# Patient Record
Sex: MALE | Race: WHITE | Employment: OTHER | URBAN - METROPOLITAN AREA
[De-identification: names, ages, dates, MRNs, and addresses within clinical notes are randomized per-mention and may not be internally consistent; named-entity substitution may affect disease eponyms.]

---

## 2017-11-22 PROBLEM — M25.551 RIGHT HIP PAIN: Status: ACTIVE | Noted: 2017-11-22

## 2018-06-05 ENCOUNTER — HOSPITAL ENCOUNTER (OUTPATIENT)
Dept: GENERAL RADIOLOGY | Age: 68
Discharge: HOME | End: 2018-06-05
Attending: INTERNAL MEDICINE
Payer: MEDICARE

## 2018-06-05 ENCOUNTER — HOSPITAL ENCOUNTER (OUTPATIENT)
Dept: ULTRASOUND IMAGING | Age: 68
Discharge: HOME | End: 2018-06-05
Attending: INTERNAL MEDICINE
Payer: MEDICARE

## 2018-06-05 DIAGNOSIS — M25.571 RIGHT ANKLE PAIN: ICD-10-CM

## 2018-06-05 DIAGNOSIS — M79.89 RIGHT LEG SWELLING: ICD-10-CM

## 2018-06-05 PROCEDURE — 73610 X-RAY EXAM OF ANKLE: CPT

## 2018-06-05 PROCEDURE — 93971 EXTREMITY STUDY: CPT

## 2018-06-05 NOTE — PROGRESS NOTES
R ankle xray negative for fracture or dislocation; he does have a small heel spur; he is to continue ice pack, rest, elevating leg, and is to call if no improvement in symptoms;

## 2018-06-13 ENCOUNTER — HOSPITAL ENCOUNTER (OUTPATIENT)
Dept: SLEEP MEDICINE | Age: 68
Discharge: HOME | End: 2018-06-13
Payer: MEDICARE

## 2018-06-13 PROCEDURE — 95810 POLYSOM 6/> YRS 4/> PARAM: CPT

## 2020-07-07 ENCOUNTER — HOSPITAL ENCOUNTER (OUTPATIENT)
Dept: PHYSICAL THERAPY | Age: 70
Discharge: HOME | End: 2020-07-07
Attending: INTERNAL MEDICINE
Payer: MEDICARE

## 2020-07-07 DIAGNOSIS — M54.2 CERVICALGIA: ICD-10-CM

## 2020-07-07 PROCEDURE — 97162 PT EVAL MOD COMPLEX 30 MIN: CPT

## 2020-07-07 PROCEDURE — 97110 THERAPEUTIC EXERCISES: CPT

## 2020-07-07 NOTE — PROGRESS NOTES
Gregg Rios  : 1950  [place]  Secondary: Jordon Morales at [address], [address], [place] [address].  [phone]   [phone]         OUTPATIENT PHYSICAL THERAPY: Daily Treatment Note 2020  Visit Count:  1    ICD-10: Treatment Diagnosis: Cervicalgia (M54.2); Abnormal posture (R29.3)  Precautions/Allergies:   Patient has no known allergies. TREATMENT PLAN:  Effective Dates: 2020 TO 10/3/2020 (90 days). Frequency/Duration: 1-2 times a week as needed for 60- 90 Day(s)    Pre-treatment Symptoms/Complaints: Forward head posture, tightness. Pain: Initial: Pain Intensity 1: 0 0/10 Post Session:  0/10   Medications Last Reviewed:  2020  Updated Objective Findings:  See evaluation note from today  TREATMENT:     THERAPEUTIC EXERCISE: (20 minutes):  Exercises per grid below to improve mobility and strength. Required minimal verbal cues to promote proper body alignment, promote proper body posture and promote proper body mechanics. Progressed resistance, range and repetitions as indicated. Date:  20 Date:   Date:     Activity/Exercise Parameters Parameters Parameters   Scapular retraction Green band x 10 reps B     B shoulder ER/Lower trap Red band x 10 reps B     Door way pec stretch 90 degrees and Y position 3 x 30 sec B     Upper trap stretch 30 sec x 3 B     Levator scap stretch 30 sec x 3 B     Standing against wall chin tuck 5 sec x 10 reps     Hamstring stretching Go over next time         MANUAL THERAPY: (0 minutes): Soft tissue mobilization was utilized and necessary because of the patient's restricted motion of soft tissue. Med[employer] Portal  Treatment/Session Summary:    · Response to Treatment:  Patient tolerated session well. Patient needed cuing for posture/technique. Will reassess knowledge of exercises next session. Continue to progress as tolerated. · Communication/Consultation:  None today  · Equipment provided today:  None today  · Recommendations/Intent for next treatment session: Next visit will focus on exercises, stretching, postural education.     Total Treatment Billable Duration:  20 minutes + eval mod  PT Patient Time In/Time Out  Time In: 0840  Time Out: [address], PT    Future Appointments   Date Time Provider Santos Espinoza   7/9/2020 10:00 AM TERESITA Junior [facility]   7/17/2020  8:45 AM Rosibel Carroll, PT [facility]   7/27/2020 10:15 AM Rosibel Carroll, PT SFE[facility]PT SFE   12/24/2020  8:00 AM MAT LAB [facility] MAT [facility]   12/29/2020  9:40 AM Bonnie Bernal MD [facility]
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
## 2020-07-17 ENCOUNTER — HOSPITAL ENCOUNTER (OUTPATIENT)
Dept: PHYSICAL THERAPY | Age: 70
Discharge: HOME | End: 2020-07-17
Attending: INTERNAL MEDICINE
Payer: MEDICARE

## 2020-07-17 PROCEDURE — 97110 THERAPEUTIC EXERCISES: CPT

## 2020-07-17 NOTE — PROGRESS NOTES
Susie Rios  : 1950  [place]  Secondary: Jordon Morales at [place]  [address], [address], [place] [address].  [phone]   [phone]         OUTPATIENT PHYSICAL THERAPY: Daily Treatment Note 2020  Visit Count:  2    ICD-10: Treatment Diagnosis: Cervicalgia (M54.2); Abnormal posture (R29.3)  Precautions/Allergies:   Patient has no known allergies. TREATMENT PLAN:  Effective Dates: 2020 TO 10/3/2020 (90 days). Frequency/Duration: 1-2 times a week as needed for 60- 90 Day(s)    Pre-treatment Symptoms/Complaints: Forward head posture, tightness. Working on [employer], but need to go over them to make sure I\"m doing them right. Neck is stiff in the mornings but improves as I move around. No pain. Pain: Initial:   0/10 Post Session:  0/10   Medications Last Reviewed:  2020  Updated Objective Findings:  None Today  TREATMENT:     THERAPEUTIC EXERCISE: (50 minutes):  Exercises per grid below to improve mobility and strength. Required minimal verbal cues to promote proper body alignment, promote proper body posture and promote proper body mechanics. Progressed resistance, range and repetitions as indicated.    Date:  20 Date:  20 Date:     Activity/Exercise Parameters Parameters Parameters   UBE  Level 2 x 5 minutes fwd/back warm up    Scapular retraction Green band x 10 reps B Blue tubing 2 x 15 reps B    Pull downs  Blue tubing 2 x 10 reps B    B shoulder ER/Lower trap Red band x 10 reps B Red band 2 x 10 reps B    B shoulder extension  Green tubing 2 x 10 reps B    Door way pec stretch 90 degrees and Y position 3 x 30 sec B 90 degrees and Y position 3 x 30 sec B    Upper trap stretch 30 sec x 3 B 30 sec x 2 B    Levator scap stretch 30 sec x 3 B 30 sec x 2 B    Standing against wall chin tuck 5 sec x 10 reps 5 sec x 10 reps    Hamstring stretching Go over next time Supine active hamstring stretch 2 x 30 sec B with towel    Prone exercises: horizontal abduction; shoulder extension; superman  X 10 reps B each exercise with 5 sec  holds    Standing with back against wall: chin tucks and scap retraction  3 sec holds 2 x 10 reps    Bridging  X 15 reps, 5 sec hold                    MedBridge Portal  Treatment/Session Summary:    · Response to Treatment:  Patient tolerated session well. He needs cuing for posture during exercises but was able to cue himself some today. Added exercises to HEP. Continue to progress as tolerated. · Communication/Consultation:  None today  · Equipment provided today:  None today  · Recommendations/Intent for next treatment session: Next visit will focus on exercises, stretching, postural education.     Total Treatment Billable Duration:  50 minutes   PT Patient Time In/Time Out  Time In: 7882  Time Out: [address], PT    Future Appointments   Date Time Provider Santos Espinoza   7/20/2020  8:30 AM TERESITA Ramesh [facility]   7/27/2020 10:15 AM Cordell Gutiérrez PT SFE[facility]PT SFE   12/24/2020  8:00 AM MAT LAB [facility] MAT [facility]   12/29/2020  9:40 AM Neto Fragoso MD [facility]

## 2020-07-27 ENCOUNTER — HOSPITAL ENCOUNTER (OUTPATIENT)
Dept: PHYSICAL THERAPY | Age: 70
Discharge: HOME | End: 2020-07-27
Attending: INTERNAL MEDICINE
Payer: MEDICARE

## 2020-07-27 PROCEDURE — 97110 THERAPEUTIC EXERCISES: CPT

## 2020-07-27 NOTE — PROGRESS NOTES
Dona Rios  : 1950  [place]  Secondary: Jordon Romo 73 at [address], [address], [facility] .  [phone]   [phone]         OUTPATIENT PHYSICAL THERAPY: Daily Treatment Note 2020  Visit Count:  3    ICD-10: Treatment Diagnosis: Cervicalgia (M54.2); Abnormal posture (R29.3)  Precautions/Allergies:   Patient has no known allergies. TREATMENT PLAN:  Effective Dates: 2020 TO 10/3/2020 (90 days). Frequency/Duration: 1-2 times a week as needed for 60- 90 Day(s)    Pre-treatment Symptoms/Complaints: Forward head posture, tightness. Stiffness and muscle soreness in mornings. Get sore in lower shoulder blade region. Pain: Initial:   0/10 Post Session:  0/10   Medications Last Reviewed:  2020  Updated Objective Findings:  None Today  TREATMENT:     THERAPEUTIC EXERCISE: (45 minutes):  Exercises per grid below to improve mobility and strength. Required minimal verbal cues to promote proper body alignment, promote proper body posture and promote proper body mechanics. Progressed resistance, range and repetitions as indicated.    Date:  20 Date:  20 Date:  20   Activity/Exercise Parameters Parameters Parameters   UBE  Level 2 x 5 minutes fwd/back warm up Level 1.5 x 6 minutes fwd/back warm up   Scapular retraction Green band x 10 reps B Blue tubing 2 x 15 reps B Blue tubing 2 x 15 reps B   Pull downs  Blue tubing 2 x 10 reps B Blue tubing 2 x 10 reps B   B shoulder ER/Lower trap Red band x 10 reps B Red band 2 x 10 reps B Red band 2 x 10 reps B   B shoulder extension  Green tubing 2 x 10 reps B Green tubing 2 x 10 reps B   Door way pec stretch 90 degrees and Y position 3 x 30 sec B 90 degrees and Y position 3 x 30 sec B 90 degrees and Y position 3 x 30 sec B   Wall pushups   2x10 reps    Upper trap stretch 30 sec x 3 B 30 sec x 2 B 30 sec x 2 B   Levator scap stretch 30 sec x 3 B 30 sec x 2 B 30 sec x 2 B   Hamstring stretching Go over next time Supine active hamstring stretch 2 x 30 sec B with towel Supine active hamstring stretch 2 x 30 sec B with towel   Prone exercises: horizontal abduction; shoulder extension; superman  X 10 reps B each exercise with 5 sec  holds X 10 reps B each exercise with 5 sec  holds   Standing with back against wall: chin tucks and scap retraction  3 sec holds 2 x 10 reps    Supine chin tucks   3 sec holds    x 10 reps   Bridging  X 15 reps, 5 sec hold X 15 reps, 5 sec hold   Isometric hip flexion in supine hooklying   5 sec x 5 each leg             MedBridge Portal  Treatment/Session Summary:    · Response to Treatment:  Patient tolerated session well. He needed some cuing for technique during exercises but is demonstrating improving competence. Continue to progress as tolerated. · Communication/Consultation:  None today  · Equipment provided today:  None today  · Recommendations/Intent for next treatment session: Next visit will focus on exercises, stretching, postural education.     Total Treatment Billable Duration:  45 minutes   PT Patient Time In/Time Out  Time In: 1462  Time Out: [address] [facility],     Future Appointments   Date Time Provider Santos Olga   12/24/2020  8:00 AM MAT LAB [facility]   12/29/2020  9:40 AM Troy Mirza MD [facility]

## 2020-08-04 ENCOUNTER — HOSPITAL ENCOUNTER (OUTPATIENT)
Dept: PHYSICAL THERAPY | Age: 70
Discharge: HOME | End: 2020-08-04
Attending: INTERNAL MEDICINE
Payer: MEDICARE

## 2020-08-04 PROCEDURE — 97110 THERAPEUTIC EXERCISES: CPT

## 2020-08-04 NOTE — PROGRESS NOTES
Millicent Rios  : 1950  [place]  Secondary: Jordon Morales at [address], [address], [place] [address].  [phone]   [phone]         OUTPATIENT PHYSICAL THERAPY: Daily Treatment Note 2020  Visit Count:  4    ICD-10: Treatment Diagnosis: Cervicalgia (M54.2); Abnormal posture (R29.3)  Precautions/Allergies:   Patient has no known allergies. TREATMENT PLAN:  Effective Dates: 2020 TO 10/3/2020 (90 days). Frequency/Duration: 1-2 times a week as needed for 60- 90 Day(s)    Pre-treatment Symptoms/Complaints: Forward head posture, tightness. Doing exercises, get some soreness but no pain. Pain: Initial: Pain Intensity 1: 0 0/10 Post Session:  0/10   Medications Last Reviewed:  2020  Updated Objective Findings:  None Today  TREATMENT:     THERAPEUTIC EXERCISE: (40 minutes):  Exercises per grid below to improve mobility and strength. Required minimal verbal cues to promote proper body alignment, promote proper body posture and promote proper body mechanics. Progressed resistance, range and repetitions as indicated.    Date:  20 Date:  20 Date:  20 Date   20   Activity/Exercise Parameters Parameters Parameters    UBE  Level 2 x 5 minutes fwd/back warm up Level 1.5 x 6 minutes fwd/back warm up Level 2 x 6 minutes fwd/back warm up   Scapular retraction Green band x 10 reps B Blue tubing 2 x 15 reps B Blue tubing 2 x 15 reps B Blue tubing 2 x 15 reps B   Pull downs  Blue tubing 2 x 10 reps B Blue tubing 2 x 10 reps B Blue tubing 2 x 10 reps B   B shoulder ER/Lower trap Red band x 10 reps B Red band 2 x 10 reps B Red band 2 x 10 reps B Red band 2 x 10 reps B   B shoulder extension  Green tubing 2 x 10 reps B Green tubing 2 x 10 reps B Green tubing 2 x 10 reps B   Door way pec stretch 90 degrees and Y position 3 x 30 sec B 90 degrees and Y position 3 x 30 sec B 90 degrees and Y position 3 x 30 sec B 90 degrees and Y position 3 x 30 sec B   Wall pushups   2x10 reps  2x15 reps    Upper trap stretch 30 sec x 3 B 30 sec x 2 B 30 sec x 2 B 30 sec x 2 B   Levator scap stretch 30 sec x 3 B 30 sec x 2 B 30 sec x 2 B 30 sec x 2 B   Hamstring stretching Go over next time Supine active hamstring stretch 2 x 30 sec B with towel Supine active hamstring stretch 2 x 30 sec B with towel Supine active hamstring stretch 2 x 30 sec B with towel    Prone exercises: horizontal abduction; shoulder extension; superman  X 10 reps B each exercise with 5 sec  holds X 10 reps B each exercise with 5 sec  holds X 15 reps B each exercise with 5 sec  holds   Prone rows      X 15 reps B each exercise with 5 sec  hold   Standing with back against wall: chin tucks and scap retraction  3 sec holds 2 x 10 reps     Supine chin tucks   3 sec holds    x 10 reps 3 sec holds    x 10 reps   Bridging  X 15 reps, 5 sec hold X 15 reps, 5 sec hold X 15 reps, 5 sec hold   Isometric hip flexion in supine hooklying   5 sec x 5 each leg 5 sec x 10 each leg             [facility] Portal  Treatment/Session Summary:    · Response to Treatment:  Patient tolerated session well. He was able to increase prone exercises to 15 reps. He is needing less cuing for posture and technique during exercises. Continue to progress as tolerated. · Communication/Consultation:  None today  · Equipment provided today:  None today  · Recommendations/Intent for next treatment session: Next visit will focus on exercises, stretching, postural education.     Total Treatment Billable Duration:  40 minutes   PT Patient Time In/Time Out  Time In: 0845  Time Out: [address], PT    Future Appointments   Date Time Provider Santos Torresi   8/17/2020  8:45 AM STEVE [facility]   12/24/2020  8:00 AM MAT LAB [facility]   12/29/2020  9:40 AM MD JANE Celestin [facility]

## 2020-08-17 ENCOUNTER — APPOINTMENT (OUTPATIENT)
Dept: PHYSICAL THERAPY | Age: 70
End: 2020-08-17
Attending: INTERNAL MEDICINE
Payer: MEDICARE

## 2020-08-26 ENCOUNTER — HOSPITAL ENCOUNTER (OUTPATIENT)
Dept: PHYSICAL THERAPY | Age: 70
Discharge: HOME | End: 2020-08-26
Attending: INTERNAL MEDICINE
Payer: MEDICARE

## 2020-08-26 PROCEDURE — 97110 THERAPEUTIC EXERCISES: CPT
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
## 2020-08-26 NOTE — PROGRESS NOTES
Caity Rios  : 1950  [place]  Secondary: Jordon Morales at [address], [address] [facility], [place] [address].  [phone]   [phone]         OUTPATIENT PHYSICAL THERAPY: Daily Treatment Note 2020  Visit Count:  5    ICD-10: Treatment Diagnosis: Cervicalgia (M54.2); Abnormal posture (R29.3)  Precautions/Allergies:   Patient has no known allergies. TREATMENT PLAN:  Effective Dates: 2020 TO 10/3/2020 (90 days). Frequency/Duration: 1-2 times a week as needed for 60- 90 Day(s)    Pre-treatment Symptoms/Complaints: Forward head posture, tightness. Stiffness, exercises are getting easier, getting stronger. Pain: Initial: Pain Intensity 1: 0 0/10 Post Session:  0/10   Medications Last Reviewed:  2020  Updated Objective Findings:  disharge  TREATMENT:     THERAPEUTIC EXERCISE: (45 minutes):  Exercises per grid below to improve mobility and strength. Required minimal verbal cues to promote proper body alignment, promote proper body posture and promote proper body mechanics. Progressed resistance, range and repetitions as indicated.    Date:  20 Date:  20 Date:  20 Date   20 Date   20   Activity/Exercise Parameters Parameters Parameters     UBE  Level 2 x 5 minutes fwd/back warm up Level 1.5 x 6 minutes fwd/back warm up Level 2 x 6 minutes fwd/back warm up Level 2 x 6 minutes fwd/back warm up   Scapular retraction Green band x 10 reps B Blue tubing 2 x 15 reps B Blue tubing 2 x 15 reps B Blue tubing 2 x 15 reps B Blue tubing 2 x 15 reps B   Pull downs  Blue tubing 2 x 10 reps B Blue tubing 2 x 10 reps B Blue tubing 2 x 10 reps B Blue tubing 2 x 15 reps B   B shoulder ER/Lower trap Red band x 10 reps B Red band 2 x 10 reps B Red band 2 x 10 reps B Red band 2 x 10 reps B Red band 2 x 10 reps B   B shoulder extension  Green tubing 2 x 10 reps B Green tubing 2 x 10 reps B Green tubing 2 x 10 reps B Green tubing 2 x 10 reps B   Door way pec stretch 90 degrees and Y position 3 x 30 sec B 90 degrees and Y position 3 x 30 sec B 90 degrees and Y position 3 x 30 sec B 90 degrees and Y position 3 x 30 sec B 90 degrees and Y position 3 x 30 sec B   Wall pushups   2x10 reps  2x15 reps  2x15 reps    Upper trap stretch 30 sec x 3 B 30 sec x 2 B 30 sec x 2 B 30 sec x 2 B 30 sec x 2 B   Levator scap stretch 30 sec x 3 B 30 sec x 2 B 30 sec x 2 B 30 sec x 2 B 30 sec x 2 B   Hamstring stretching Go over next time Supine active hamstring stretch 2 x 30 sec B with towel Supine active hamstring stretch 2 x 30 sec B with towel Supine active hamstring stretch 2 x 30 sec B with towel  Supine active hamstring stretch 2 x 30 sec B with towel    Prone exercises: horizontal abduction; shoulder extension; superman  X 10 reps B each exercise with 5 sec  holds X 10 reps B each exercise with 5 sec  holds X 15 reps B each exercise with 5 sec  holds X 15 reps B each exercise with 5 sec  Holds with one pound except for supermans   Prone rows      X 15 reps B each exercise with 5 sec  hold X 15 reps B each exercise with 5 sec  Holds with one pounds. Standing with back against wall: chin tucks and scap retraction  3 sec holds 2 x 10 reps      Supine chin tucks   3 sec holds    x 10 reps 3 sec holds    x 10 reps 3 sec holds    x 10 reps   Bridging  X 15 reps, 5 sec hold X 15 reps, 5 sec hold X 15 reps, 5 sec hold X 15 reps, 5 sec hold   Isometric hip flexion in supine hooklying   5 sec x 5 each leg 5 sec x 10 each leg 5 sec x 10 each leg             [employer] Portal  Treatment/Session Summary:    · Response to Treatment:  Patient tolerated session well. He is independent and compliant with HEP. We will discharge pt at this time.  .    · Communication/Consultation:  None today  · Equipment provided today:  None today  · Recommendations/Intent for next treatment session: Next visit will focus on exercises, stretching, postural education.     Total Treatment Billable Duration:  45 minutes   PT Patient Time In/Time Out  Time In: 0845  Time Out: [address], PT    Future Appointments   Date Time Provider Santos Espinoza   12/24/2020  8:00 AM MAT [facility]   12/29/2020  9:40 AM Charles Roland MD [facility]

## 2020-11-19 ENCOUNTER — HOSPITAL ENCOUNTER (OUTPATIENT)
Dept: GENERAL RADIOLOGY | Age: 70
Discharge: HOME | End: 2020-11-19
Attending: INTERNAL MEDICINE
Payer: MEDICARE

## 2020-11-19 DIAGNOSIS — M25.551 RIGHT HIP PAIN: ICD-10-CM

## 2020-11-19 PROCEDURE — 73502 X-RAY EXAM HIP UNI 2-3 VIEWS: CPT

## 2020-11-19 NOTE — PROGRESS NOTES
Talked to pt and informed him, per Dr. Juan Bradley, R hip xray revealed mild OA, no fracture; he is to call if no improvement or worsening symptoms.

## 2020-12-22 ENCOUNTER — HOSPITAL ENCOUNTER (OUTPATIENT)
Dept: MAMMOGRAPHY | Age: 70
Discharge: HOME | End: 2020-12-22
Attending: INTERNAL MEDICINE
Payer: MEDICARE

## 2020-12-22 DIAGNOSIS — M85.851 OSTEOPENIA OF RIGHT HIP: ICD-10-CM

## 2020-12-22 DIAGNOSIS — M89.9 DISORDER OF BONE AND CARTILAGE: ICD-10-CM

## 2020-12-22 DIAGNOSIS — M25.551 RIGHT HIP PAIN: ICD-10-CM

## 2020-12-22 DIAGNOSIS — M94.9 DISORDER OF BONE AND CARTILAGE: ICD-10-CM

## 2020-12-22 PROCEDURE — 77080 DXA BONE DENSITY AXIAL: CPT

## 2020-12-23 NOTE — PROGRESS NOTES
Ensure taking daily calcium and vitamin D supplement, weight bearing exercises. Will recheck bone density in 2 years.

## 2020-12-24 NOTE — PROGRESS NOTES
Talked to pt and informed him, pt is to ensure taking daily calcium and vitamin D supplement, weight bearing exercises. Will recheck bone density in 2 years.

## 2021-01-06 PROBLEM — M16.11 PRIMARY OSTEOARTHRITIS OF RIGHT HIP: Status: ACTIVE | Noted: 2021-01-06

## 2022-03-19 PROBLEM — M25.551 RIGHT HIP PAIN: Status: ACTIVE | Noted: 2017-11-22

## 2022-03-19 PROBLEM — M16.11 PRIMARY OSTEOARTHRITIS OF RIGHT HIP: Status: ACTIVE | Noted: 2021-01-06

## 2022-08-11 DIAGNOSIS — E78.2 MIXED HYPERLIPIDEMIA: ICD-10-CM

## 2022-08-11 DIAGNOSIS — R73.01 IFG (IMPAIRED FASTING GLUCOSE): ICD-10-CM

## 2022-08-11 DIAGNOSIS — E55.9 VITAMIN D INSUFFICIENCY: Primary | ICD-10-CM

## 2022-08-11 DIAGNOSIS — R35.1 NOCTURIA: ICD-10-CM

## 2022-08-19 ENCOUNTER — NURSE ONLY (OUTPATIENT)
Dept: INTERNAL MEDICINE CLINIC | Facility: CLINIC | Age: 72
End: 2022-08-19

## 2022-08-19 DIAGNOSIS — R73.01 IFG (IMPAIRED FASTING GLUCOSE): ICD-10-CM

## 2022-08-19 DIAGNOSIS — E55.9 VITAMIN D INSUFFICIENCY: ICD-10-CM

## 2022-08-19 DIAGNOSIS — R35.1 NOCTURIA: ICD-10-CM

## 2022-08-19 DIAGNOSIS — E78.2 MIXED HYPERLIPIDEMIA: ICD-10-CM

## 2022-08-19 LAB
25(OH)D3 SERPL-MCNC: 47.6 NG/ML (ref 30–100)
ALBUMIN SERPL-MCNC: 3.3 G/DL (ref 3.2–4.6)
ALBUMIN/GLOB SERPL: 1.1 {RATIO} (ref 1.2–3.5)
ALP SERPL-CCNC: 64 U/L (ref 50–136)
ALT SERPL-CCNC: 21 U/L (ref 12–65)
ANION GAP SERPL CALC-SCNC: 4 MMOL/L (ref 7–16)
AST SERPL-CCNC: 14 U/L (ref 15–37)
BASOPHILS # BLD: 0.1 K/UL (ref 0–0.2)
BASOPHILS NFR BLD: 1 % (ref 0–2)
BILIRUB SERPL-MCNC: 0.7 MG/DL (ref 0.2–1.1)
BUN SERPL-MCNC: 15 MG/DL (ref 8–23)
CALCIUM SERPL-MCNC: 8.7 MG/DL (ref 8.3–10.4)
CHLORIDE SERPL-SCNC: 107 MMOL/L (ref 98–107)
CHOLEST SERPL-MCNC: 146 MG/DL
CO2 SERPL-SCNC: 31 MMOL/L (ref 21–32)
CREAT SERPL-MCNC: 0.9 MG/DL (ref 0.8–1.5)
DIFFERENTIAL METHOD BLD: ABNORMAL
EOSINOPHIL # BLD: 0.1 K/UL (ref 0–0.8)
EOSINOPHIL NFR BLD: 1 % (ref 0.5–7.8)
ERYTHROCYTE [DISTWIDTH] IN BLOOD BY AUTOMATED COUNT: 12.9 % (ref 11.9–14.6)
EST. AVERAGE GLUCOSE BLD GHB EST-MCNC: 105 MG/DL
GLOBULIN SER CALC-MCNC: 3.1 G/DL (ref 2.3–3.5)
GLUCOSE SERPL-MCNC: 102 MG/DL (ref 65–100)
HBA1C MFR BLD: 5.3 % (ref 4.8–5.6)
HCT VFR BLD AUTO: 46.7 % (ref 41.1–50.3)
HDLC SERPL-MCNC: 61 MG/DL (ref 40–60)
HDLC SERPL: 2.4 {RATIO}
HGB BLD-MCNC: 15.4 G/DL (ref 13.6–17.2)
IMM GRANULOCYTES # BLD AUTO: 0 K/UL (ref 0–0.5)
IMM GRANULOCYTES NFR BLD AUTO: 0 % (ref 0–5)
LDLC SERPL CALC-MCNC: 63 MG/DL
LYMPHOCYTES # BLD: 2.9 K/UL (ref 0.5–4.6)
LYMPHOCYTES NFR BLD: 33 % (ref 13–44)
MCH RBC QN AUTO: 33.2 PG (ref 26.1–32.9)
MCHC RBC AUTO-ENTMCNC: 33 G/DL (ref 31.4–35)
MCV RBC AUTO: 100.6 FL (ref 79.6–97.8)
MONOCYTES # BLD: 1 K/UL (ref 0.1–1.3)
MONOCYTES NFR BLD: 11 % (ref 4–12)
NEUTS SEG # BLD: 4.7 K/UL (ref 1.7–8.2)
NEUTS SEG NFR BLD: 54 % (ref 43–78)
NRBC # BLD: 0 K/UL (ref 0–0.2)
PLATELET # BLD AUTO: 195 K/UL (ref 150–450)
PMV BLD AUTO: 11.2 FL (ref 9.4–12.3)
POTASSIUM SERPL-SCNC: 4.3 MMOL/L (ref 3.5–5.1)
PROT SERPL-MCNC: 6.4 G/DL (ref 6.3–8.2)
RBC # BLD AUTO: 4.64 M/UL (ref 4.23–5.6)
SODIUM SERPL-SCNC: 142 MMOL/L (ref 138–145)
TRIGL SERPL-MCNC: 110 MG/DL (ref 35–150)
TSH W FREE THYROID IF ABNORMAL: 1.26 UIU/ML (ref 0.36–3.74)
VLDLC SERPL CALC-MCNC: 22 MG/DL (ref 6–23)
WBC # BLD AUTO: 8.7 K/UL (ref 4.3–11.1)

## 2022-08-20 LAB
APPEARANCE UR: CLEAR
BACTERIA URNS QL MICRO: NEGATIVE /HPF
BILIRUB UR QL: NEGATIVE
CASTS URNS QL MICRO: ABNORMAL /LPF
COLOR UR: ABNORMAL
EPI CELLS #/AREA URNS HPF: ABNORMAL /HPF
GLUCOSE UR STRIP.AUTO-MCNC: NEGATIVE MG/DL
HGB UR QL STRIP: NEGATIVE
KETONES UR QL STRIP.AUTO: NEGATIVE MG/DL
LEUKOCYTE ESTERASE UR QL STRIP.AUTO: NEGATIVE
MUCOUS THREADS URNS QL MICRO: 0 /LPF
NITRITE UR QL STRIP.AUTO: NEGATIVE
PH UR STRIP: 6 [PH] (ref 5–9)
PROT UR STRIP-MCNC: NEGATIVE MG/DL
PSA SERPL-MCNC: 1.2 NG/ML
RBC #/AREA URNS HPF: ABNORMAL /HPF
SP GR UR REFRACTOMETRY: 1.01 (ref 1–1.02)
URINE CULTURE IF INDICATED: ABNORMAL
UROBILINOGEN UR QL STRIP.AUTO: 0.2 EU/DL (ref 0.2–1)
WBC URNS QL MICRO: ABNORMAL /HPF

## 2022-08-22 NOTE — PROGRESS NOTES
Jose G Sanderson (:  1950) is a 74 y.o. male,Established patient, here for evaluation of the following chief complaint(s):  Medicare AWV         ASSESSMENT/PLAN:  1. Medicare annual wellness visit, subsequent  Medicare wellness responses reviewed in the office today  PHQ 9 score of 3 in the office today  MMSE score of 30/30 in the office today  Colonoscopy on 2016 with 10-year recall  PSA within normal limits on 2022  Mini-Mental status exam score of 30/30 in the office today  Up-to-date on immunizations including COVID-19 vaccination booster  Counseled patient on increasing physical activity with goal of 30 minutes multiple days per week as tolerated  Counseled on alcohol consumption in moderation with long-term use associated with increased risk for liver disease    2. Dyslipidemia  Lipid panel from 2022 reviewed  Continue rosuvastatin and lifestyle modifications    - CBC with Auto Differential; Future  - Comprehensive Metabolic Panel; Future  - Lipid Panel; Future  - T4, Free; Future  - TSH; Future    3. Irregular heart beat  EKG in office today shows sinus bradycardia with heart rate of 56 bpm, PVC, normal axis, no acute ST segment or T wave abnormalities  Patient to alert provider if increasing frequency of lightheadedness, overt palpitations or development of chest pain or additional symptoms    - EKG 12 Lead; Future  - EKG 12 Lead    4. Erectile dysfunction, unspecified erectile dysfunction type  Start trial of as needed sildenafil with patient counseled on potential adverse effects and instructed to seek immediate medical attention should he experience an erection lasting more than 4 hours    - sildenafil (VIAGRA) 50 MG tablet; Take one tablet by mouth 30-60 minutes prior to intercourse as needed for erectile dysfunction. Max of one tablet per twenty-four hours  Dispense: 5 tablet; Refill: 5      No follow-ups on file.          Subjective   SUBJECTIVE/OBJECTIVE:  Patient is a 26-year-old  male who presents to the office today for his Medicare wellness visit. Patient without any significant complaints. Still has baseline tremor of both hands, somewhat at rest but worse with activity or intention. Has been ongoing for over 10 years. States his father also had a tremor. States it is primarily isolated to the hands but not yet severe enough where he desires to see a neurologist or start medication. Still has occasional straining with urination and nocturia but not severe enough where he desires medication. No dysuria or hematuria. Still reports some stiffness in his neck but no associated neck or upper extremity pain or upper extremity numbness or paresthesias. Currently works as a  a few days a week. States he does a variety of things around the house and is frequently climbing up and down stairs between his basement and upper part of his home but does not do any extra exercise. Does have a few drinks per day most days of the week. Does not smoke, due for up-to-date eye exam especially given history of left cataract next month. Denies active chest pain, shortness of breath, palpitations, melena, hematochezia or lower extremity swelling. No suicidal or homicidal ideations. Does occasionally have transient lightheadedness lasting a few seconds typically occurring once a month seemingly at random. Patient admits his memory is not as good as it used to, at times having delayed recall especially with remembering the names of acquaintances. States his wife primarily manages their finances and cooks. Patient denies difficulty with directions while driving. Patient also reports some issues with erectile dysfunction stating more of his issue is requiring as opposed to maintaining erection. Still reports intact libido but not as much as it used to be. No history of genital or spinal cord or head trauma.       Review of Systems   Respiratory:  Negative for shortness of breath. Cardiovascular:  Negative for chest pain, palpitations and leg swelling. Gastrointestinal:  Negative for anal bleeding and blood in stool. Genitourinary:  Positive for difficulty urinating (Straining with urination, nocturia). Negative for dysuria and hematuria. Musculoskeletal:  Positive for arthralgias (Primarily involving the hands). Neurological:  Positive for tremors and light-headedness. Negative for numbness. Denies paresthesias of the upper extremities   Psychiatric/Behavioral:  Negative for self-injury and suicidal ideas. Objective   Physical Exam  Vitals reviewed. Constitutional:       General: He is not in acute distress. Appearance: Normal appearance. He is not ill-appearing, toxic-appearing or diaphoretic. HENT:      Head: Normocephalic and atraumatic. Right Ear: Tympanic membrane, ear canal and external ear normal. There is no impacted cerumen. Left Ear: Tympanic membrane, ear canal and external ear normal. There is no impacted cerumen. Mouth/Throat:      Mouth: Mucous membranes are moist.   Eyes:      General:         Right eye: No discharge. Left eye: No discharge. Extraocular Movements: Extraocular movements intact. Comments: Mild horizontal nystagmus noted with extraocular muscle testing  Visual fields grossly intact and symmetric bilaterally   Neck:      Vascular: No carotid bruit. Cardiovascular:      Rate and Rhythm: Bradycardia present. Rhythm irregular. Heart sounds: No murmur heard. No friction rub. No gallop. Pulmonary:      Effort: Pulmonary effort is normal. No respiratory distress. Breath sounds: No wheezing, rhonchi or rales. Abdominal:      General: Bowel sounds are normal.      Palpations: Abdomen is soft. Tenderness: There is no abdominal tenderness. There is no guarding. Skin:     General: Skin is warm and dry. Coloration: Skin is not jaundiced.       Comments: Superficial abrasion/wound along lateral aspect of left index finger with some redness but no bleeding, drainage or streaking   Neurological:      Mental Status: He is alert. Cranial Nerves: No cranial nerve deficit. Sensory: No sensory deficit. Motor: No weakness (Muscle strength 5/5 and symmetric in all 4 extremities). Psychiatric:         Attention and Perception: Attention normal.         Mood and Affect: Mood and affect normal. Mood is not anxious or depressed. Speech: Speech normal. Speech is not rapid and pressured or slurred. Behavior: Behavior is not agitated, aggressive or combative. Behavior is cooperative. Thought Content: Thought content normal.         Cognition and Memory: Cognition normal.                An electronic signature was used to authenticate this note. --Romel Haque, DO       Medicare Annual Wellness Visit    Casandra Morales is here for Medicare AWV    Assessment & Plan   Medicare annual wellness visit, subsequent  Dyslipidemia  -     CBC with Auto Differential; Future  -     Comprehensive Metabolic Panel; Future  -     Lipid Panel; Future  -     T4, Free; Future  -     TSH; Future  Irregular heart beat  -     EKG 12 Lead; Future  Erectile dysfunction, unspecified erectile dysfunction type  -     sildenafil (VIAGRA) 50 MG tablet; Take one tablet by mouth 30-60 minutes prior to intercourse as needed for erectile dysfunction. Max of one tablet per twenty-four hours, Disp-5 tablet, R-5Print    Recommendations for Preventive Services Due: see orders and patient instructions/AVS.  Recommended screening schedule for the next 5-10 years is provided to the patient in written form: see Patient Instructions/AVS.     Return in about 6 months (around 2/26/2023). Subjective       Patient's complete Health Risk Assessment and screening values have been reviewed and are found in Flowsheets.  The following problems were reviewed today and where indicated follow up appointments were made and/or referrals ordered. Positive Risk Factor Screenings with Interventions:             General Health and ACP:  General  In general, how would you say your health is?: Very Good  In the past 7 days, have you experienced any of the following: New or Increased Pain, New or Increased Fatigue, Loneliness, Social Isolation, Stress or Anger?: (!) Yes  Select all that apply: (!) New or Increased Pain (secondary to left index finger injury)  Do you get the social and emotional support that you need?: Yes  Do you have a Living Will?: (!) No    Advance Directives       Power of  Living Will ACP-Advance Directive ACP-Power of     Not on File Not on File Not on File Not on File          General Health Risk Interventions:  Pain issues: Hand pain secondary to known arthritis     Health Habits/Nutrition:  Physical Activity: Insufficiently Active    Days of Exercise per Week: 1 day    Minutes of Exercise per Session: 80 min     Have you lost any weight without trying in the past 3 months?: No  Body mass index: (!) 30.68  Have you seen the dentist within the past year?: Yes  Health Habits/Nutrition Interventions:  Inadequate physical activity:  Encouraged increasing physical activity with goal of 30 minutes of moderate exercise several days per week as tolerated     Hearing/Vision:  Do you or your family notice any trouble with your hearing that hasn't been managed with hearing aids?: No  Do you have difficulty driving, watching TV, or doing any of your daily activities because of your eyesight?: (!) Yes (known left eye cataract)  Have you had an eye exam within the past year?: Yes  No results found.   Hearing/Vision Interventions:  Vision concerns:  patient encouraged to make appointment with his/her eye specialist    Safety:  Do you have working smoke detectors?: Yes  Do you have any tripping hazards - loose or unsecured carpets or rugs?: (!) Yes  Do you have any tripping hazards - clutter in doorways, halls, or stairs?: (!) Yes  Do you have either shower bars, grab bars, non-slip mats or non-slip surfaces in your shower or bathtub?: (!) No  Do all of your stairways have a railing or banister?: Yes  Do you always fasten your seatbelt when you are in a car?: Yes  Safety Interventions:  Reassess at future visit            Objective   Vitals:    08/26/22 1008   BP: 118/80   Site: Left Upper Arm   Position: Sitting   Cuff Size: Medium Adult   Pulse: 63   Temp: 97.3 °F (36.3 °C)   TempSrc: Temporal   SpO2: 97%   Weight: 173 lb 3.2 oz (78.6 kg)   Height: 5' 3\" (1.6 m)      Body mass index is 30.68 kg/m². No Known Allergies  Prior to Visit Medications    Medication Sig Taking? Authorizing Provider   Multiple Vitamin (MULTIVITAMIN ADULT PO) Take 1 tablet by mouth daily Yes Historical Provider, MD   Omega 3 1000 MG CAPS Take 1 capsule by mouth Yes Historical Provider, MD   sildenafil (VIAGRA) 50 MG tablet Take one tablet by mouth 30-60 minutes prior to intercourse as needed for erectile dysfunction.  Max of one tablet per twenty-four hours Yes Tanika Hitchcock DO   aspirin 81 MG EC tablet Take 81 mg by mouth daily Yes Ar Automatic Reconciliation   calcium carb-cholecalciferol 600-400 MG-UNIT TABS per tab Take 2 tablets by mouth daily Yes Ar Automatic Reconciliation   vitamin D 25 MCG (1000 UT) CAPS Take by mouth Yes Ar Automatic Reconciliation   rosuvastatin (CRESTOR) 20 MG tablet TAKE 1 TABLET NIGHTLY Yes Ar Automatic Reconciliation       CareTeam (Including outside providers/suppliers regularly involved in providing care):   Patient Care Team:  Tanika Hitchcock DO as PCP - General  Tanika Hitchcock DO as PCP - Surjit Salinas Provider     Reviewed and updated this visit:  Tobacco  Allergies  Meds  Med Hx  Surg Hx  Soc Hx  Fam Hx

## 2022-08-26 ENCOUNTER — OFFICE VISIT (OUTPATIENT)
Dept: INTERNAL MEDICINE CLINIC | Facility: CLINIC | Age: 72
End: 2022-08-26
Payer: MEDICARE

## 2022-08-26 VITALS
WEIGHT: 173.2 LBS | OXYGEN SATURATION: 97 % | DIASTOLIC BLOOD PRESSURE: 80 MMHG | BODY MASS INDEX: 30.69 KG/M2 | HEART RATE: 63 BPM | TEMPERATURE: 97.3 F | SYSTOLIC BLOOD PRESSURE: 118 MMHG | HEIGHT: 63 IN

## 2022-08-26 DIAGNOSIS — E78.5 DYSLIPIDEMIA: ICD-10-CM

## 2022-08-26 DIAGNOSIS — Z00.00 MEDICARE ANNUAL WELLNESS VISIT, SUBSEQUENT: Primary | ICD-10-CM

## 2022-08-26 DIAGNOSIS — N52.9 ERECTILE DYSFUNCTION, UNSPECIFIED ERECTILE DYSFUNCTION TYPE: ICD-10-CM

## 2022-08-26 DIAGNOSIS — I49.9 IRREGULAR HEART BEAT: ICD-10-CM

## 2022-08-26 PROCEDURE — 3017F COLORECTAL CA SCREEN DOC REV: CPT | Performed by: STUDENT IN AN ORGANIZED HEALTH CARE EDUCATION/TRAINING PROGRAM

## 2022-08-26 PROCEDURE — 1123F ACP DISCUSS/DSCN MKR DOCD: CPT | Performed by: STUDENT IN AN ORGANIZED HEALTH CARE EDUCATION/TRAINING PROGRAM

## 2022-08-26 PROCEDURE — 93000 ELECTROCARDIOGRAM COMPLETE: CPT | Performed by: STUDENT IN AN ORGANIZED HEALTH CARE EDUCATION/TRAINING PROGRAM

## 2022-08-26 PROCEDURE — G0439 PPPS, SUBSEQ VISIT: HCPCS | Performed by: STUDENT IN AN ORGANIZED HEALTH CARE EDUCATION/TRAINING PROGRAM

## 2022-08-26 RX ORDER — SILDENAFIL 50 MG/1
TABLET, FILM COATED ORAL
Qty: 5 TABLET | Refills: 5 | Status: SHIPPED | OUTPATIENT
Start: 2022-08-26

## 2022-08-26 RX ORDER — OMEGA-3 FATTY ACIDS/FISH OIL 300-1000MG
1 CAPSULE ORAL
COMMUNITY

## 2022-08-26 ASSESSMENT — PATIENT HEALTH QUESTIONNAIRE - PHQ9
SUM OF ALL RESPONSES TO PHQ QUESTIONS 1-9: 4
SUM OF ALL RESPONSES TO PHQ9 QUESTIONS 1 & 2: 1
4. FEELING TIRED OR HAVING LITTLE ENERGY: 0
3. TROUBLE FALLING OR STAYING ASLEEP: 1
5. POOR APPETITE OR OVEREATING: 0
8. MOVING OR SPEAKING SO SLOWLY THAT OTHER PEOPLE COULD HAVE NOTICED. OR THE OPPOSITE, BEING SO FIGETY OR RESTLESS THAT YOU HAVE BEEN MOVING AROUND A LOT MORE THAN USUAL: 1
SUM OF ALL RESPONSES TO PHQ QUESTIONS 1-9: 4
7. TROUBLE CONCENTRATING ON THINGS, SUCH AS READING THE NEWSPAPER OR WATCHING TELEVISION: 1
SUM OF ALL RESPONSES TO PHQ QUESTIONS 1-9: 4
1. LITTLE INTEREST OR PLEASURE IN DOING THINGS: 0
6. FEELING BAD ABOUT YOURSELF - OR THAT YOU ARE A FAILURE OR HAVE LET YOURSELF OR YOUR FAMILY DOWN: 0
SUM OF ALL RESPONSES TO PHQ QUESTIONS 1-9: 4
9. THOUGHTS THAT YOU WOULD BE BETTER OFF DEAD, OR OF HURTING YOURSELF: 0
2. FEELING DOWN, DEPRESSED OR HOPELESS: 1

## 2022-08-26 ASSESSMENT — LIFESTYLE VARIABLES
HOW MANY STANDARD DRINKS CONTAINING ALCOHOL DO YOU HAVE ON A TYPICAL DAY: 1 OR 2
HOW OFTEN DO YOU HAVE A DRINK CONTAINING ALCOHOL: 4 OR MORE TIMES A WEEK

## 2022-08-26 ASSESSMENT — ENCOUNTER SYMPTOMS
SHORTNESS OF BREATH: 0
ANAL BLEEDING: 0
BLOOD IN STOOL: 0

## 2022-08-26 NOTE — PATIENT INSTRUCTIONS
Personalized Preventive Plan for Oneil Sanderson - 8/26/2022  Medicare offers a range of preventive health benefits. Some of the tests and screenings are paid in full while other may be subject to a deductible, co-insurance, and/or copay. Some of these benefits include a comprehensive review of your medical history including lifestyle, illnesses that may run in your family, and various assessments and screenings as appropriate. After reviewing your medical record and screening and assessments performed today your provider may have ordered immunizations, labs, imaging, and/or referrals for you. A list of these orders (if applicable) as well as your Preventive Care list are included within your After Visit Summary for your review. Other Preventive Recommendations:    A preventive eye exam performed by an eye specialist is recommended every 1-2 years to screen for glaucoma; cataracts, macular degeneration, and other eye disorders. A preventive dental visit is recommended every 6 months. Try to get at least 150 minutes of exercise per week or 10,000 steps per day on a pedometer . Order or download the FREE \"Exercise & Physical Activity: Your Everyday Guide\" from [employer]. Call 5-394.285.4099 or search [employer] online. You need 3588-0019 mg of calcium and 6364-5264 IU of vitamin D per day. It is possible to meet your calcium requirement with diet alone, but a vitamin D supplement is usually necessary to meet this goal.  When exposed to the sun, use a sunscreen that protects against both UVA and UVB radiation with an SPF of 30 or greater. Reapply every 2 to 3 hours or after sweating, drying off with a towel, or swimming. Always wear a seat belt when traveling in a car. Always wear a helmet when riding a bicycle or motorcycle.

## 2023-02-24 DIAGNOSIS — E78.5 DYSLIPIDEMIA: ICD-10-CM

## 2023-02-24 LAB
ALBUMIN SERPL-MCNC: 3.1 G/DL (ref 3.2–4.6)
ALBUMIN/GLOB SERPL: 1 (ref 0.4–1.6)
ALP SERPL-CCNC: 51 U/L (ref 50–136)
ALT SERPL-CCNC: 30 U/L (ref 12–65)
ANION GAP SERPL CALC-SCNC: 1 MMOL/L (ref 2–11)
AST SERPL-CCNC: 12 U/L (ref 15–37)
BASOPHILS # BLD: 0.1 K/UL (ref 0–0.2)
BASOPHILS NFR BLD: 1 % (ref 0–2)
BILIRUB SERPL-MCNC: 0.6 MG/DL (ref 0.2–1.1)
BUN SERPL-MCNC: 12 MG/DL (ref 8–23)
CALCIUM SERPL-MCNC: 9.1 MG/DL (ref 8.3–10.4)
CHLORIDE SERPL-SCNC: 107 MMOL/L (ref 101–110)
CHOLEST SERPL-MCNC: 141 MG/DL
CO2 SERPL-SCNC: 31 MMOL/L (ref 21–32)
CREAT SERPL-MCNC: 1 MG/DL (ref 0.8–1.5)
DIFFERENTIAL METHOD BLD: NORMAL
EOSINOPHIL # BLD: 0.1 K/UL (ref 0–0.8)
EOSINOPHIL NFR BLD: 1 % (ref 0.5–7.8)
ERYTHROCYTE [DISTWIDTH] IN BLOOD BY AUTOMATED COUNT: 12.3 % (ref 11.9–14.6)
GLOBULIN SER CALC-MCNC: 3.2 G/DL (ref 2.8–4.5)
GLUCOSE SERPL-MCNC: 102 MG/DL (ref 65–100)
HCT VFR BLD AUTO: 45.2 % (ref 41.1–50.3)
HDLC SERPL-MCNC: 57 MG/DL (ref 40–60)
HDLC SERPL: 2.5
HGB BLD-MCNC: 15.4 G/DL (ref 13.6–17.2)
IMM GRANULOCYTES # BLD AUTO: 0 K/UL (ref 0–0.5)
IMM GRANULOCYTES NFR BLD AUTO: 0 % (ref 0–5)
LDLC SERPL CALC-MCNC: 49.6 MG/DL
LYMPHOCYTES # BLD: 2.8 K/UL (ref 0.5–4.6)
LYMPHOCYTES NFR BLD: 44 % (ref 13–44)
MCH RBC QN AUTO: 32.8 PG (ref 26.1–32.9)
MCHC RBC AUTO-ENTMCNC: 34.1 G/DL (ref 31.4–35)
MCV RBC AUTO: 96.2 FL (ref 82–102)
MONOCYTES # BLD: 0.7 K/UL (ref 0.1–1.3)
MONOCYTES NFR BLD: 11 % (ref 4–12)
NEUTS SEG # BLD: 2.7 K/UL (ref 1.7–8.2)
NEUTS SEG NFR BLD: 43 % (ref 43–78)
NRBC # BLD: 0 K/UL (ref 0–0.2)
PLATELET # BLD AUTO: 202 K/UL (ref 150–450)
PMV BLD AUTO: 10.8 FL (ref 9.4–12.3)
POTASSIUM SERPL-SCNC: 4.4 MMOL/L (ref 3.5–5.1)
PROT SERPL-MCNC: 6.3 G/DL (ref 6.3–8.2)
RBC # BLD AUTO: 4.7 M/UL (ref 4.23–5.6)
SODIUM SERPL-SCNC: 139 MMOL/L (ref 133–143)
T4 FREE SERPL-MCNC: 1 NG/DL (ref 0.78–1.46)
TRIGL SERPL-MCNC: 172 MG/DL (ref 35–150)
TSH, 3RD GENERATION: 1.32 UIU/ML (ref 0.36–3.74)
VLDLC SERPL CALC-MCNC: 34.4 MG/DL (ref 6–23)
WBC # BLD AUTO: 6.3 K/UL (ref 4.3–11.1)

## 2023-02-26 NOTE — PROGRESS NOTES
Bernadette Sanderson (:  1950) is a 74 y.o. male,Established patient, here for evaluation of the following chief complaint(s):  Cholesterol Problem, Medication Refill, and Discuss Labs         ASSESSMENT/PLAN:  1. Osteopenia of multiple sites  DEXA scan on 2020 with 10-year overall fracture risk of 13.5%, 10-year hip fracture risk of 4.2%  Repeat DEXA scan, continue calcium and vitamin D supplementation    - DEXA BONE DENSITY AXIAL SKELETON; Future    2. Dyslipidemia  Labs from 2023 with mildly elevated triglycerides but LDL at goal at 49  Continue current dose of rosuvastatin, counseled on decreasing alcohol consumption as this is likely contributing to hypertriglyceridemia    - rosuvastatin (CRESTOR) 20 MG tablet; Take 1 tablet by mouth nightly  Dispense: 90 tablet; Refill: 3    3. Snoring  Given snoring as well as questionable apnea versus paroxysmal nocturnal dyspnea refer to sleep medicine for polysomnogram to evaluate for obstructive sleep apnea    - [address] [place] [address] Sleep Medicine, [facility]    4. Right hip pain  Suspect underlying osteoarthritis of right hip joint  No difficulty with weightbearing or radiculopathy  Recommended as needed topical therapy and over-the-counter analgesics    5. TMJ syndrome  Recommended as needed heating pad and over-the-counter anti-inflammatories    Return in about 6 months (around 2023). Subjective   SUBJECTIVE/OBJECTIVE:  Patient is a 20-year-old  male who presents to the office today for follow-up. Patient overall is doing well. States 2 weeks ago after doing a significant amount of heavy lifting he noticed some blood on the toilet paper with wiping. Has not recurred since. Does have remote history of hemorrhoids requiring hemorrhoidectomy. Has been noticing some pain and stiffness in his right hip but not severe enough to require medication. No difficulty with ambulation.   Does have chronic weak urinary stream, nocturia and incomplete bladder emptying but not severe enough where he desires medication. Has been told in the past that his prostate is not as small as it used to be. Denies dysuria, hematuria, chest pain, shortness of breath, wheezing, abdominal pain, nausea, vomiting or anorexia. States that he will catch himself waking up in the middle the night short of breath if he lies on his back. Has been prone to snoring in the past.  Remote history of sleep study but does not believe it was a good quality test.      Review of Systems   Constitutional:  Negative for appetite change. Respiratory:  Negative for shortness of breath and wheezing. Cardiovascular:  Negative for chest pain and leg swelling. Gastrointestinal:  Negative for abdominal pain, anal bleeding, blood in stool, constipation, diarrhea, nausea and vomiting. Genitourinary:  Positive for difficulty urinating (Weak urinary stream, nocturia, incomplete bladder emptying). Negative for dysuria and hematuria. Psychiatric/Behavioral:  Positive for sleep disturbance (Snoring, questionable apnea). Objective   Physical Exam  Vitals reviewed. Constitutional:       General: He is not in acute distress. Appearance: Normal appearance. He is not ill-appearing, toxic-appearing or diaphoretic. HENT:      Head: Normocephalic and atraumatic. Left Ear: Tympanic membrane, ear canal and external ear normal. There is no impacted cerumen. Mouth/Throat:      Mouth: Mucous membranes are moist.      Pharynx: Oropharynx is clear. Comments: Left TMJ nontender to palpation but some pain reproduced with jaw opening and closing  Eyes:      General:         Right eye: No discharge. Left eye: No discharge. Extraocular Movements: Extraocular movements intact. Cardiovascular:      Rate and Rhythm: Normal rate and regular rhythm. Heart sounds: No murmur heard. No friction rub. No gallop.    Pulmonary:      Effort: Pulmonary effort is normal. No respiratory distress. Breath sounds: No wheezing, rhonchi or rales. Abdominal:      General: Bowel sounds are normal.      Palpations: Abdomen is soft. Tenderness: There is no abdominal tenderness. There is no right CVA tenderness, left CVA tenderness or guarding. Musculoskeletal:      Comments: Passive range of motion of both hips preserved in flexion, internal and external rotation with some bilateral hip discomfort reproduced with passive internal rotation  No significant leg length discrepancy  Mild tenderness to palpation along left lateral thigh   Skin:     General: Skin is dry. Coloration: Skin is not jaundiced. Neurological:      Mental Status: Mental status is at baseline. Psychiatric:         Attention and Perception: Attention normal.         Mood and Affect: Mood and affect normal. Mood is not anxious or depressed. Affect is not tearful. Speech: Speech normal. Speech is not rapid and pressured or slurred. Behavior: Behavior normal. Behavior is not agitated, aggressive or combative. Behavior is cooperative. Thought Content: Thought content normal.                An electronic signature was used to authenticate this note.     --Cj Carey, DO

## 2023-02-27 ENCOUNTER — OFFICE VISIT (OUTPATIENT)
Dept: INTERNAL MEDICINE CLINIC | Facility: CLINIC | Age: 73
End: 2023-02-27
Payer: MEDICARE

## 2023-02-27 VITALS
WEIGHT: 173 LBS | SYSTOLIC BLOOD PRESSURE: 140 MMHG | OXYGEN SATURATION: 98 % | BODY MASS INDEX: 30.65 KG/M2 | HEART RATE: 58 BPM | TEMPERATURE: 97.2 F | DIASTOLIC BLOOD PRESSURE: 82 MMHG | HEIGHT: 63 IN

## 2023-02-27 DIAGNOSIS — E78.5 DYSLIPIDEMIA: ICD-10-CM

## 2023-02-27 DIAGNOSIS — M85.89 OSTEOPENIA OF MULTIPLE SITES: Primary | ICD-10-CM

## 2023-02-27 DIAGNOSIS — M25.551 RIGHT HIP PAIN: ICD-10-CM

## 2023-02-27 DIAGNOSIS — M26.629 TMJ SYNDROME: ICD-10-CM

## 2023-02-27 DIAGNOSIS — R06.83 SNORING: ICD-10-CM

## 2023-02-27 PROCEDURE — G8427 DOCREV CUR MEDS BY ELIG CLIN: HCPCS | Performed by: STUDENT IN AN ORGANIZED HEALTH CARE EDUCATION/TRAINING PROGRAM

## 2023-02-27 PROCEDURE — 99214 OFFICE O/P EST MOD 30 MIN: CPT | Performed by: STUDENT IN AN ORGANIZED HEALTH CARE EDUCATION/TRAINING PROGRAM

## 2023-02-27 PROCEDURE — G8417 CALC BMI ABV UP PARAM F/U: HCPCS | Performed by: STUDENT IN AN ORGANIZED HEALTH CARE EDUCATION/TRAINING PROGRAM

## 2023-02-27 PROCEDURE — G8484 FLU IMMUNIZE NO ADMIN: HCPCS | Performed by: STUDENT IN AN ORGANIZED HEALTH CARE EDUCATION/TRAINING PROGRAM

## 2023-02-27 PROCEDURE — 3017F COLORECTAL CA SCREEN DOC REV: CPT | Performed by: STUDENT IN AN ORGANIZED HEALTH CARE EDUCATION/TRAINING PROGRAM

## 2023-02-27 PROCEDURE — 1036F TOBACCO NON-USER: CPT | Performed by: STUDENT IN AN ORGANIZED HEALTH CARE EDUCATION/TRAINING PROGRAM

## 2023-02-27 PROCEDURE — 1123F ACP DISCUSS/DSCN MKR DOCD: CPT | Performed by: STUDENT IN AN ORGANIZED HEALTH CARE EDUCATION/TRAINING PROGRAM

## 2023-02-27 RX ORDER — ROSUVASTATIN CALCIUM 20 MG/1
20 TABLET, COATED ORAL NIGHTLY
Qty: 90 TABLET | Refills: 3 | Status: SHIPPED | OUTPATIENT
Start: 2023-02-27

## 2023-02-27 SDOH — ECONOMIC STABILITY: HOUSING INSECURITY
IN THE LAST 12 MONTHS, WAS THERE A TIME WHEN YOU DID NOT HAVE A STEADY PLACE TO SLEEP OR SLEPT IN A SHELTER (INCLUDING NOW)?: NO

## 2023-02-27 SDOH — ECONOMIC STABILITY: INCOME INSECURITY: HOW HARD IS IT FOR YOU TO PAY FOR THE VERY BASICS LIKE FOOD, HOUSING, MEDICAL CARE, AND HEATING?: NOT HARD AT ALL

## 2023-02-27 SDOH — ECONOMIC STABILITY: FOOD INSECURITY: WITHIN THE PAST 12 MONTHS, THE FOOD YOU BOUGHT JUST DIDN'T LAST AND YOU DIDN'T HAVE MONEY TO GET MORE.: NEVER TRUE

## 2023-02-27 SDOH — ECONOMIC STABILITY: FOOD INSECURITY: WITHIN THE PAST 12 MONTHS, YOU WORRIED THAT YOUR FOOD WOULD RUN OUT BEFORE YOU GOT MONEY TO BUY MORE.: NEVER TRUE

## 2023-02-27 ASSESSMENT — ENCOUNTER SYMPTOMS
SHORTNESS OF BREATH: 0
ABDOMINAL PAIN: 0
DIARRHEA: 0
ANAL BLEEDING: 0
WHEEZING: 0
CONSTIPATION: 0
BLOOD IN STOOL: 0
VOMITING: 0
NAUSEA: 0

## 2023-02-27 ASSESSMENT — PATIENT HEALTH QUESTIONNAIRE - PHQ9
SUM OF ALL RESPONSES TO PHQ QUESTIONS 1-9: 0
SUM OF ALL RESPONSES TO PHQ QUESTIONS 1-9: 0
1. LITTLE INTEREST OR PLEASURE IN DOING THINGS: 0
SUM OF ALL RESPONSES TO PHQ9 QUESTIONS 1 & 2: 0
2. FEELING DOWN, DEPRESSED OR HOPELESS: 0
SUM OF ALL RESPONSES TO PHQ QUESTIONS 1-9: 0
SUM OF ALL RESPONSES TO PHQ QUESTIONS 1-9: 0

## 2023-03-22 ENCOUNTER — HOSPITAL ENCOUNTER (OUTPATIENT)
Dept: MAMMOGRAPHY | Age: 73
Discharge: HOME | End: 2023-03-25
Payer: MEDICARE

## 2023-03-22 DIAGNOSIS — M85.89 OSTEOPENIA OF MULTIPLE SITES: ICD-10-CM

## 2023-03-22 PROCEDURE — 77080 DXA BONE DENSITY AXIAL: CPT

## 2023-04-27 NOTE — PROGRESS NOTES
[address] [place] [address] Court, [address] [facility]  [phone]    Patient Name:  Yaya Latham  YOB: 1950      Office Visit 4/28/2023    CHIEF COMPLAINT:    Chief Complaint   Patient presents with    New Patient     SNORING        HISTORY OF PRESENT ILLNESS:      The patient presents in outpatient consultation at the request of Dr. Hipolito Sharif for suspected obstructive sleep apnea. Significant PMH of CKD, essential tremor, osteopenia and hyperlipidemia. He reports that he did have a negative sleep study about 5 years ago. States that he did not feel like the study was valid due to having issues during the night while trying to sleep and then not getting results for like 6 months. He reports that he does have 1 sister and 2 brothers who have sleep apnea. States that he cannot sleep on his back because if he dies he will definitely awaken during the night and gasps for air. States he has awoken himself by snoring. He also has had friends in the past tell him that he will stop breathing during the night while sleeping. He does report recently having some problems with focusing on task and remembering. States that he has had problems with leg cramps while trying to sleep and fidgetiness of his legs during the day for years. Has never had an official diagnosis of restless leg syndrome. He states he avoids taking any naps during the day and tries to stay busy. Reports that he may occasionally take a nap just due to boredom. He denies being excessively sleepy or fatigued but his [place] score is 11/24. Reports that for the most part he feels like he awakens in the morning feeling fully rested. States that his bedtime hours are from 11:30 PM until 8:30 AM and they remain the same on the weekends because he is no longer working and is retired.   He denies any issues with initially going to sleep but does state that he will get up 2-3 times per night to

## 2023-04-28 ENCOUNTER — OFFICE VISIT (OUTPATIENT)
Dept: SLEEP MEDICINE | Age: 73
End: 2023-04-28

## 2023-04-28 VITALS
BODY MASS INDEX: 25.62 KG/M2 | DIASTOLIC BLOOD PRESSURE: 76 MMHG | WEIGHT: 173 LBS | HEART RATE: 60 BPM | SYSTOLIC BLOOD PRESSURE: 120 MMHG | OXYGEN SATURATION: 97 % | TEMPERATURE: 97 F | HEIGHT: 69 IN

## 2023-04-28 DIAGNOSIS — G47.10 HYPERSOMNIA: ICD-10-CM

## 2023-04-28 DIAGNOSIS — G25.81 RLS (RESTLESS LEGS SYNDROME): ICD-10-CM

## 2023-04-28 DIAGNOSIS — R06.83 SNORING: ICD-10-CM

## 2023-04-28 DIAGNOSIS — E61.1 IRON DEFICIENCY: ICD-10-CM

## 2023-04-28 DIAGNOSIS — R29.818 SUSPECTED SLEEP APNEA: Primary | ICD-10-CM

## 2023-04-28 DIAGNOSIS — R06.81 WITNESSED APNEIC SPELLS: ICD-10-CM

## 2023-04-28 ASSESSMENT — SLEEP AND FATIGUE QUESTIONNAIRES
HOW LIKELY ARE YOU TO NOD OFF OR FALL ASLEEP WHILE SITTING AND READING: 2
HOW LIKELY ARE YOU TO NOD OFF OR FALL ASLEEP WHILE LYING DOWN TO REST IN THE AFTERNOON WHEN CIRCUMSTANCES PERMIT: 2
HOW LIKELY ARE YOU TO NOD OFF OR FALL ASLEEP IN A CAR, WHILE STOPPED FOR A FEW MINUTES IN TRAFFIC: 0
HOW LIKELY ARE YOU TO NOD OFF OR FALL ASLEEP WHILE WATCHING TV: 2
ESS TOTAL SCORE: 11
HOW LIKELY ARE YOU TO NOD OFF OR FALL ASLEEP WHILE SITTING INACTIVE IN A PUBLIC PLACE: 1
HOW LIKELY ARE YOU TO NOD OFF OR FALL ASLEEP WHILE SITTING AND TALKING TO SOMEONE: 0
HOW LIKELY ARE YOU TO NOD OFF OR FALL ASLEEP WHILE SITTING QUIETLY AFTER LUNCH WITHOUT ALCOHOL: 1
HOW LIKELY ARE YOU TO NOD OFF OR FALL ASLEEP WHEN YOU ARE A PASSENGER IN A CAR FOR AN HOUR WITHOUT A BREAK: 3

## 2023-04-28 NOTE — PATIENT INSTRUCTIONS
Home sleep study ordered  Ferritin level check ordered  Follow-up after sleep study or after starting treatment or sooner if needed

## 2023-05-02 DIAGNOSIS — G25.81 RLS (RESTLESS LEGS SYNDROME): ICD-10-CM

## 2023-05-02 DIAGNOSIS — E61.1 IRON DEFICIENCY: ICD-10-CM

## 2023-05-02 LAB — FERRITIN SERPL-MCNC: 440 NG/ML (ref 8–388)

## 2023-05-04 ENCOUNTER — TELEPHONE (OUTPATIENT)
Dept: SLEEP MEDICINE | Age: 73
End: 2023-05-04

## 2023-05-04 NOTE — TELEPHONE ENCOUNTER
Called patient and informed him that his ferritin level came back high at 440. I did ask him if he had any relatives that he knew of that had hemochromatosis. He reports that he can remember back to when his father was older and had cancer that they used to have to take blood from him but he is not sure if that is because he had high iron/hemochromatosis or if it was related to his cancer. He will let his primary care provider know of these results and proceed from there.

## 2023-07-17 ENCOUNTER — HOSPITAL ENCOUNTER (OUTPATIENT)
Dept: SLEEP CENTER | Age: 73
Discharge: HOME | End: 2023-07-20

## 2023-08-10 ENCOUNTER — TELEPHONE (OUTPATIENT)
Dept: SLEEP MEDICINE | Age: 73
End: 2023-08-10

## 2023-08-11 ENCOUNTER — TELEPHONE (OUTPATIENT)
Dept: SLEEP MEDICINE | Age: 73
End: 2023-08-11

## 2023-08-11 DIAGNOSIS — G47.30 SLEEP APNEA, UNSPECIFIED TYPE: Primary | ICD-10-CM

## 2023-08-17 DIAGNOSIS — E78.5 DYSLIPIDEMIA: Primary | ICD-10-CM

## 2023-08-17 DIAGNOSIS — E55.9 VITAMIN D INSUFFICIENCY: ICD-10-CM

## 2023-08-17 DIAGNOSIS — R73.01 IFG (IMPAIRED FASTING GLUCOSE): ICD-10-CM

## 2023-08-21 ENCOUNTER — HOSPITAL ENCOUNTER (OUTPATIENT)
Dept: LAB | Age: 73
Discharge: HOME | End: 2023-08-24

## 2023-08-21 DIAGNOSIS — E78.5 DYSLIPIDEMIA: ICD-10-CM

## 2023-08-21 DIAGNOSIS — E55.9 VITAMIN D INSUFFICIENCY: ICD-10-CM

## 2023-08-21 DIAGNOSIS — R73.01 IFG (IMPAIRED FASTING GLUCOSE): ICD-10-CM

## 2023-08-21 LAB
25(OH)D3 SERPL-MCNC: 55.9 NG/ML (ref 30–100)
ALBUMIN SERPL-MCNC: 3.2 G/DL (ref 3.2–4.6)
ALBUMIN/GLOB SERPL: 1 (ref 0.4–1.6)
ALP SERPL-CCNC: 57 U/L (ref 50–136)
ALT SERPL-CCNC: 17 U/L (ref 12–65)
ANION GAP SERPL CALC-SCNC: 9 MMOL/L (ref 2–11)
AST SERPL-CCNC: 12 U/L (ref 15–37)
BASOPHILS # BLD: 0.1 K/UL (ref 0–0.2)
BASOPHILS NFR BLD: 1 % (ref 0–2)
BILIRUB SERPL-MCNC: 0.7 MG/DL (ref 0.2–1.1)
BUN SERPL-MCNC: 13 MG/DL (ref 8–23)
CALCIUM SERPL-MCNC: 8.5 MG/DL (ref 8.3–10.4)
CHLORIDE SERPL-SCNC: 111 MMOL/L (ref 101–110)
CHOLEST SERPL-MCNC: 148 MG/DL
CO2 SERPL-SCNC: 25 MMOL/L (ref 21–32)
CREAT SERPL-MCNC: 0.9 MG/DL (ref 0.8–1.5)
DIFFERENTIAL METHOD BLD: NORMAL
EOSINOPHIL # BLD: 0.1 K/UL (ref 0–0.8)
EOSINOPHIL NFR BLD: 1 % (ref 0.5–7.8)
ERYTHROCYTE [DISTWIDTH] IN BLOOD BY AUTOMATED COUNT: 12 % (ref 11.9–14.6)
EST. AVERAGE GLUCOSE BLD GHB EST-MCNC: 103 MG/DL
GLOBULIN SER CALC-MCNC: 3.1 G/DL (ref 2.8–4.5)
GLUCOSE SERPL-MCNC: 97 MG/DL (ref 65–100)
HBA1C MFR BLD: 5.2 % (ref 4.8–5.6)
HCT VFR BLD AUTO: 46.5 % (ref 41.1–50.3)
HDLC SERPL-MCNC: 60 MG/DL (ref 40–60)
HDLC SERPL: 2.5
HGB BLD-MCNC: 15.8 G/DL (ref 13.6–17.2)
IMM GRANULOCYTES # BLD AUTO: 0 K/UL (ref 0–0.5)
IMM GRANULOCYTES NFR BLD AUTO: 0 % (ref 0–5)
LDLC SERPL CALC-MCNC: 60.6 MG/DL
LYMPHOCYTES # BLD: 3.7 K/UL (ref 0.5–4.6)
LYMPHOCYTES NFR BLD: 41 % (ref 13–44)
MCH RBC QN AUTO: 32.8 PG (ref 26.1–32.9)
MCHC RBC AUTO-ENTMCNC: 34 G/DL (ref 31.4–35)
MCV RBC AUTO: 96.7 FL (ref 82–102)
MONOCYTES # BLD: 0.8 K/UL (ref 0.1–1.3)
MONOCYTES NFR BLD: 9 % (ref 4–12)
NEUTS SEG # BLD: 4.3 K/UL (ref 1.7–8.2)
NEUTS SEG NFR BLD: 48 % (ref 43–78)
NRBC # BLD: 0 K/UL (ref 0–0.2)
PLATELET # BLD AUTO: 199 K/UL (ref 150–450)
PMV BLD AUTO: 11.2 FL (ref 9.4–12.3)
POTASSIUM SERPL-SCNC: 4.4 MMOL/L (ref 3.5–5.1)
PROT SERPL-MCNC: 6.3 G/DL (ref 6.3–8.2)
RBC # BLD AUTO: 4.81 M/UL (ref 4.23–5.6)
SODIUM SERPL-SCNC: 145 MMOL/L (ref 133–143)
TRIGL SERPL-MCNC: 137 MG/DL (ref 35–150)
VLDLC SERPL CALC-MCNC: 27.4 MG/DL (ref 6–23)
WBC # BLD AUTO: 8.9 K/UL (ref 4.3–11.1)

## 2023-08-27 NOTE — PROGRESS NOTES
Team:  Popeye Bower DO as PCP - General  Popeye Bower DO as PCP - Empaneled Provider     Reviewed and updated this visit:  Allergies  Meds  Med Hx  Surg Hx  Soc Hx  Fam Hx

## 2023-08-28 ENCOUNTER — OFFICE VISIT (OUTPATIENT)
Dept: INTERNAL MEDICINE CLINIC | Facility: CLINIC | Age: 73
End: 2023-08-28
Payer: MEDICARE

## 2023-08-28 VITALS
RESPIRATION RATE: 16 BRPM | DIASTOLIC BLOOD PRESSURE: 64 MMHG | HEART RATE: 61 BPM | HEIGHT: 69 IN | WEIGHT: 169.2 LBS | TEMPERATURE: 97.1 F | SYSTOLIC BLOOD PRESSURE: 140 MMHG | BODY MASS INDEX: 25.06 KG/M2 | OXYGEN SATURATION: 95 %

## 2023-08-28 DIAGNOSIS — Z12.5 PROSTATE CANCER SCREENING: ICD-10-CM

## 2023-08-28 DIAGNOSIS — Z00.00 MEDICARE ANNUAL WELLNESS VISIT, SUBSEQUENT: Primary | ICD-10-CM

## 2023-08-28 DIAGNOSIS — E78.5 DYSLIPIDEMIA: ICD-10-CM

## 2023-08-28 DIAGNOSIS — G47.33 OBSTRUCTIVE SLEEP APNEA: ICD-10-CM

## 2023-08-28 DIAGNOSIS — R79.89 ELEVATED FERRITIN LEVEL: ICD-10-CM

## 2023-08-28 PROCEDURE — 1123F ACP DISCUSS/DSCN MKR DOCD: CPT | Performed by: STUDENT IN AN ORGANIZED HEALTH CARE EDUCATION/TRAINING PROGRAM

## 2023-08-28 PROCEDURE — G0439 PPPS, SUBSEQ VISIT: HCPCS | Performed by: STUDENT IN AN ORGANIZED HEALTH CARE EDUCATION/TRAINING PROGRAM

## 2023-08-28 PROCEDURE — 3017F COLORECTAL CA SCREEN DOC REV: CPT | Performed by: STUDENT IN AN ORGANIZED HEALTH CARE EDUCATION/TRAINING PROGRAM

## 2023-08-28 RX ORDER — ROSUVASTATIN CALCIUM 20 MG/1
20 TABLET, COATED ORAL NIGHTLY
Qty: 90 TABLET | Refills: 2 | Status: SHIPPED | OUTPATIENT
Start: 2023-08-28

## 2023-08-28 ASSESSMENT — LIFESTYLE VARIABLES
HOW OFTEN DURING THE LAST YEAR HAVE YOU BEEN UNABLE TO REMEMBER WHAT HAPPENED THE NIGHT BEFORE BECAUSE YOU HAD BEEN DRINKING: 0
HOW OFTEN DURING THE LAST YEAR HAVE YOU HAD A FEELING OF GUILT OR REMORSE AFTER DRINKING: 0
HOW OFTEN DURING THE LAST YEAR HAVE YOU FAILED TO DO WHAT WAS NORMALLY EXPECTED FROM YOU BECAUSE OF DRINKING: 0
HAVE YOU OR SOMEONE ELSE BEEN INJURED AS A RESULT OF YOUR DRINKING: 0
HOW OFTEN DURING THE LAST YEAR HAVE YOU FOUND THAT YOU WERE NOT ABLE TO STOP DRINKING ONCE YOU HAD STARTED: 0
HOW MANY STANDARD DRINKS CONTAINING ALCOHOL DO YOU HAVE ON A TYPICAL DAY: 3 OR 4
HOW OFTEN DURING THE LAST YEAR HAVE YOU NEEDED AN ALCOHOLIC DRINK FIRST THING IN THE MORNING TO GET YOURSELF GOING AFTER A NIGHT OF HEAVY DRINKING: 0
HAS A RELATIVE, FRIEND, DOCTOR, OR ANOTHER HEALTH PROFESSIONAL EXPRESSED CONCERN ABOUT YOUR DRINKING OR SUGGESTED YOU CUT DOWN: 0
HOW OFTEN DO YOU HAVE A DRINK CONTAINING ALCOHOL: 4 OR MORE TIMES A WEEK

## 2023-08-28 ASSESSMENT — ENCOUNTER SYMPTOMS
NAUSEA: 0
ABDOMINAL PAIN: 0
SHORTNESS OF BREATH: 0
VOMITING: 0
BLOOD IN STOOL: 0
ANAL BLEEDING: 0

## 2023-08-28 ASSESSMENT — PATIENT HEALTH QUESTIONNAIRE - PHQ9
SUM OF ALL RESPONSES TO PHQ QUESTIONS 1-9: 0
SUM OF ALL RESPONSES TO PHQ QUESTIONS 1-9: 0
SUM OF ALL RESPONSES TO PHQ9 QUESTIONS 1 & 2: 0
1. LITTLE INTEREST OR PLEASURE IN DOING THINGS: 0
SUM OF ALL RESPONSES TO PHQ QUESTIONS 1-9: 0
SUM OF ALL RESPONSES TO PHQ QUESTIONS 1-9: 0
2. FEELING DOWN, DEPRESSED OR HOPELESS: 0

## 2023-09-11 ENCOUNTER — HOSPITAL ENCOUNTER (OUTPATIENT)
Dept: LAB | Age: 73
Discharge: HOME | End: 2023-09-14

## 2023-09-11 DIAGNOSIS — R79.89 ELEVATED FERRITIN LEVEL: ICD-10-CM

## 2023-09-11 DIAGNOSIS — Z12.5 PROSTATE CANCER SCREENING: ICD-10-CM

## 2023-09-11 LAB
FERRITIN SERPL-MCNC: 334 NG/ML (ref 8–388)
IRON SATN MFR SERPL: 46 %
IRON SERPL-MCNC: 97 UG/DL (ref 35–150)
PSA SERPL-MCNC: 1.1 NG/ML
TIBC SERPL-MCNC: 209 UG/DL (ref 250–450)

## 2023-09-12 ENCOUNTER — TELEPHONE (OUTPATIENT)
Dept: INTERNAL MEDICINE CLINIC | Facility: CLINIC | Age: 73
End: 2023-09-12

## 2023-09-12 DIAGNOSIS — R79.0 ABNORMAL IRON SATURATION: Primary | ICD-10-CM

## 2023-09-12 NOTE — TELEPHONE ENCOUNTER
Labs from 5/2/2023 with elevated ferritin at 440 ng/mL. Repeat labs from 9/11/2023 with ferritin upper limit of normal at 334, iron saturation 46%. Confirm patient is not taking an iron-containing supplement/multivitamin. Agreeable to hematology evaluation to assess for possibility for excessive iron storage/hemochromatosis.     Orders Placed This Encounter    [facility]     Referral Priority:   Routine     Referral Type:   Eval and Treat     Referral Reason:   Specialty Services Required     Requested Specialty:   Hematology and Oncology     Number of Visits Requested:   1

## 2023-10-16 ENCOUNTER — HOSPITAL ENCOUNTER (OUTPATIENT)
Dept: LAB | Age: 73
Discharge: HOME | End: 2023-10-19
Payer: MEDICARE

## 2023-10-16 ENCOUNTER — OFFICE VISIT (OUTPATIENT)
Dept: ONCOLOGY | Age: 73
End: 2023-10-16
Payer: MEDICARE

## 2023-10-16 VITALS
OXYGEN SATURATION: 98 % | WEIGHT: 168.6 LBS | HEIGHT: 69 IN | DIASTOLIC BLOOD PRESSURE: 78 MMHG | BODY MASS INDEX: 24.97 KG/M2 | TEMPERATURE: 97.4 F | HEART RATE: 55 BPM | SYSTOLIC BLOOD PRESSURE: 127 MMHG | RESPIRATION RATE: 16 BRPM

## 2023-10-16 DIAGNOSIS — E83.19 IRON OVERLOAD: ICD-10-CM

## 2023-10-16 DIAGNOSIS — E83.19 IRON OVERLOAD: Primary | ICD-10-CM

## 2023-10-16 DIAGNOSIS — Z03.89 ENCOUNTER FOR OBSERVATION FOR OTHER SUSPECTED DISEASES AND CONDITIONS RULED OUT: ICD-10-CM

## 2023-10-16 LAB
ALBUMIN SERPL-MCNC: 3.3 G/DL (ref 3.2–4.6)
ALBUMIN/GLOB SERPL: 0.9 (ref 0.4–1.6)
ALP SERPL-CCNC: 62 U/L (ref 50–136)
ALT SERPL-CCNC: 17 U/L (ref 12–65)
ANION GAP SERPL CALC-SCNC: 3 MMOL/L (ref 2–11)
AST SERPL-CCNC: 18 U/L (ref 15–37)
BASOPHILS # BLD: 0 K/UL (ref 0–0.2)
BASOPHILS NFR BLD: 1 % (ref 0–2)
BILIRUB SERPL-MCNC: 0.5 MG/DL (ref 0.2–1.1)
BUN SERPL-MCNC: 10 MG/DL (ref 8–23)
CALCIUM SERPL-MCNC: 8.8 MG/DL (ref 8.3–10.4)
CHLORIDE SERPL-SCNC: 108 MMOL/L (ref 101–110)
CO2 SERPL-SCNC: 31 MMOL/L (ref 21–32)
CREAT SERPL-MCNC: 1 MG/DL (ref 0.8–1.5)
DIFFERENTIAL METHOD BLD: ABNORMAL
EOSINOPHIL # BLD: 0.1 K/UL (ref 0–0.8)
EOSINOPHIL NFR BLD: 1 % (ref 0.5–7.8)
ERYTHROCYTE [DISTWIDTH] IN BLOOD BY AUTOMATED COUNT: 12.7 % (ref 11.9–14.6)
FERRITIN SERPL-MCNC: 364 NG/ML (ref 8–388)
GLOBULIN SER CALC-MCNC: 3.7 G/DL (ref 2.8–4.5)
GLUCOSE SERPL-MCNC: 107 MG/DL (ref 65–100)
HCT VFR BLD AUTO: 46.2 % (ref 41.1–50.3)
HGB BLD-MCNC: 15.8 G/DL (ref 13.6–17.2)
IMM GRANULOCYTES # BLD AUTO: 0 K/UL (ref 0–0.5)
IMM GRANULOCYTES NFR BLD AUTO: 0 % (ref 0–5)
IRON SATN MFR SERPL: 37 %
IRON SERPL-MCNC: 83 UG/DL (ref 35–150)
LYMPHOCYTES # BLD: 3.1 K/UL (ref 0.5–4.6)
LYMPHOCYTES NFR BLD: 40 % (ref 13–44)
MCH RBC QN AUTO: 32.6 PG (ref 26.1–32.9)
MCHC RBC AUTO-ENTMCNC: 34.2 G/DL (ref 31.4–35)
MCV RBC AUTO: 95.5 FL (ref 82–102)
MONOCYTES # BLD: 1.1 K/UL (ref 0.1–1.3)
MONOCYTES NFR BLD: 14 % (ref 4–12)
NEUTS SEG # BLD: 3.4 K/UL (ref 1.7–8.2)
NEUTS SEG NFR BLD: 44 % (ref 43–78)
NRBC # BLD: 0 K/UL (ref 0–0.2)
PLATELET # BLD AUTO: 185 K/UL (ref 150–450)
PMV BLD AUTO: 10.3 FL (ref 9.4–12.3)
POTASSIUM SERPL-SCNC: 4.6 MMOL/L (ref 3.5–5.1)
PROT SERPL-MCNC: 7 G/DL (ref 6.3–8.2)
RBC # BLD AUTO: 4.84 M/UL (ref 4.23–5.6)
SODIUM SERPL-SCNC: 142 MMOL/L (ref 133–143)
TIBC SERPL-MCNC: 225 UG/DL (ref 250–450)
WBC # BLD AUTO: 7.6 K/UL (ref 4.3–11.1)

## 2023-10-16 PROCEDURE — 85025 COMPLETE CBC W/AUTO DIFF WBC: CPT

## 2023-10-16 PROCEDURE — 80053 COMPREHEN METABOLIC PANEL: CPT

## 2023-10-16 PROCEDURE — G8420 CALC BMI NORM PARAMETERS: HCPCS | Performed by: INTERNAL MEDICINE

## 2023-10-16 PROCEDURE — G8428 CUR MEDS NOT DOCUMENT: HCPCS | Performed by: INTERNAL MEDICINE

## 2023-10-16 PROCEDURE — 1123F ACP DISCUSS/DSCN MKR DOCD: CPT | Performed by: INTERNAL MEDICINE

## 2023-10-16 PROCEDURE — 83550 IRON BINDING TEST: CPT

## 2023-10-16 PROCEDURE — 1036F TOBACCO NON-USER: CPT | Performed by: INTERNAL MEDICINE

## 2023-10-16 PROCEDURE — 36415 COLL VENOUS BLD VENIPUNCTURE: CPT

## 2023-10-16 PROCEDURE — 3017F COLORECTAL CA SCREEN DOC REV: CPT | Performed by: INTERNAL MEDICINE

## 2023-10-16 PROCEDURE — 83540 ASSAY OF IRON: CPT

## 2023-10-16 PROCEDURE — 99203 OFFICE O/P NEW LOW 30 MIN: CPT | Performed by: INTERNAL MEDICINE

## 2023-10-16 PROCEDURE — 82728 ASSAY OF FERRITIN: CPT

## 2023-10-16 PROCEDURE — G8484 FLU IMMUNIZE NO ADMIN: HCPCS | Performed by: INTERNAL MEDICINE

## 2023-10-16 ASSESSMENT — PATIENT HEALTH QUESTIONNAIRE - PHQ9: DEPRESSION UNABLE TO ASSESS: PT REFUSES

## 2023-10-16 NOTE — PATIENT INSTRUCTIONS
Patient Instructions from Today's Visit    Reason for Visit:  New patient visit      Plan: Will run ore labs for further assessment and will call you with results. Follow Up:  AS scheduled     Recent Lab Results:  After visit    Treatment Summary has been discussed and given to patient: n/s        -------------------------------------------------------------------------------------------------------------------  Please call our office at [phone] if you have any  of the following symptoms:   Fever of 100.5 or greater  Chills  Shortness of breath  Swelling or pain in one leg    After office hours an answering service is available and will contact a provider for emergencies or if you are experiencing any of the above symptoms. Patient does express an interest in My Chart. My Chart log in information explained on the after visit summary printout at the [address] desk.     Barak Louis RN

## 2023-10-16 NOTE — PROGRESS NOTES
ferritin 334, TSAT 46%. I discussed the pathophysiology and natural history of iron overload conditions with Mr. Gama Angeles. We will check HFE gene mutation analysis and repeat his TSAT/ferritin, if he is negative for [facility] I would not recommend phlebotomy, but if he has a mutation I would consider phlebotomy to a goal of ferritin . He understands and agrees to testing today. All questions were asked and answered to the best of my ability. In all, I spent 30 minutes in the care of Mr. Gama Angeles today, over 50% of which was in direct counseling and coordination of care.           Gilberto Espitia MD  [facility]  [address]  Office : [phone]  Fax : [phone]

## 2023-10-20 LAB
HFE GENE MUT ANL BLD/T: NORMAL
REVIEWED BY: NORMAL

## 2023-11-08 ENCOUNTER — OFFICE VISIT (OUTPATIENT)
Dept: ONCOLOGY | Age: 73
End: 2023-11-08
Payer: MEDICARE

## 2023-11-08 VITALS
DIASTOLIC BLOOD PRESSURE: 64 MMHG | RESPIRATION RATE: 12 BRPM | SYSTOLIC BLOOD PRESSURE: 127 MMHG | HEIGHT: 69 IN | WEIGHT: 164.7 LBS | BODY MASS INDEX: 24.4 KG/M2 | TEMPERATURE: 98.2 F | HEART RATE: 62 BPM | OXYGEN SATURATION: 97 %

## 2023-11-08 DIAGNOSIS — Z03.89 ENCOUNTER FOR OBSERVATION FOR OTHER SUSPECTED DISEASES AND CONDITIONS RULED OUT: ICD-10-CM

## 2023-11-08 DIAGNOSIS — E83.110 HEREDITARY HEMOCHROMATOSIS (HCC): Primary | ICD-10-CM

## 2023-11-08 PROCEDURE — G8428 CUR MEDS NOT DOCUMENT: HCPCS | Performed by: INTERNAL MEDICINE

## 2023-11-08 PROCEDURE — G8484 FLU IMMUNIZE NO ADMIN: HCPCS | Performed by: INTERNAL MEDICINE

## 2023-11-08 PROCEDURE — 3017F COLORECTAL CA SCREEN DOC REV: CPT | Performed by: INTERNAL MEDICINE

## 2023-11-08 PROCEDURE — 1036F TOBACCO NON-USER: CPT | Performed by: INTERNAL MEDICINE

## 2023-11-08 PROCEDURE — 99214 OFFICE O/P EST MOD 30 MIN: CPT | Performed by: INTERNAL MEDICINE

## 2023-11-08 PROCEDURE — 1123F ACP DISCUSS/DSCN MKR DOCD: CPT | Performed by: INTERNAL MEDICINE

## 2023-11-08 PROCEDURE — G8420 CALC BMI NORM PARAMETERS: HCPCS | Performed by: INTERNAL MEDICINE

## 2023-11-08 ASSESSMENT — PATIENT HEALTH QUESTIONNAIRE - PHQ9
SUM OF ALL RESPONSES TO PHQ QUESTIONS 1-9: 0
2. FEELING DOWN, DEPRESSED OR HOPELESS: 0
1. LITTLE INTEREST OR PLEASURE IN DOING THINGS: 0
SUM OF ALL RESPONSES TO PHQ QUESTIONS 1-9: 0
SUM OF ALL RESPONSES TO PHQ QUESTIONS 1-9: 0
SUM OF ALL RESPONSES TO PHQ9 QUESTIONS 1 & 2: 0
SUM OF ALL RESPONSES TO PHQ QUESTIONS 1-9: 0

## 2023-11-08 NOTE — PROGRESS NOTES
[facility] Hematology and Oncology: Office Visit Established Patient    Chief Complaint:    Chief Complaint   Patient presents with    Follow-up         History of Present Illness:  Mr. Brianne Melendez is a 74 y.o. male who presents today for follow-up regarding elevated ferritin. This was done at the sleep center because of RLS, it was 440 on routine labs. Repeat labs in September 2023 showed a TSAT 46% and ferritin of 334. He is now referred to hematology for evaluation of possible iron overload. We will check HFE gene mutation analysis and repeat his TSAT/ferritin, if he is negative for [facility] I would not recommend phlebotomy, but if he has a mutation I would consider phlebotomy to a goal of ferritin . He understands and agrees to testing today. He returns today for follow-up of recent lab testing. No symptoms, he feels well currently. Review of Systems:  Constitutional: Negative. HENT: Negative. Eyes: Negative. Respiratory: Negative. Cardiovascular: Negative. Gastrointestinal: Negative. Genitourinary: Negative. Musculoskeletal: Negative. Skin: Negative. Neurological: Negative. Endo/Heme/Allergies: Negative. Psychiatric/Behavioral: Negative. All other systems reviewed and are negative.      No Known Allergies  Past Medical History:   Diagnosis Date    CKD (chronic kidney disease)     Diverticulosis     Essential tremor     Glucose intolerance (impaired glucose tolerance)     History of stress test >25 years ago    WNL    Hyperlipemia     Onychomycosis     Osteopenia      Past Surgical History:   Procedure Laterality Date    CATARACT REMOVAL Right 02/2018    Dr. Winston Tucker, [address]  02/2017    due for repeat in 10 years; Dr. Ramsey Old  04/2012    Right ankle, hardware in place     Family History   Problem Relation Age of Onset    Heart Attack Other         paternal uncle    Diabetes Father     Cancer Father

## 2023-11-08 NOTE — PATIENT INSTRUCTIONS
Patient Instructions from Today's Visit    Reason for Visit:  Follow up Elevated Ferritin     Diagnosis Information:  [email or web address]. net/about-us/asco-answers-patient-education-materials/fwvl-orfmfmf-kxez-sheets    Plan:  Lab results reviewed     Follow Up: Follow up 5 wk    Recent Lab Results:  No visits with results within 3 Day(s) from this visit.    Latest known visit with results is:   Hospital Outpatient Visit on 10/16/2023   Component Date Value Ref Range Status    WBC 10/16/2023 7.6  4.3 - 11.1 K/uL Final    RBC 10/16/2023 4.84  4.23 - 5.6 M/uL Final    Hemoglobin 10/16/2023 15.8  13.6 - 17.2 g/dL Final    Hematocrit 10/16/2023 46.2  41.1 - 50.3 % Final    MCV 10/16/2023 95.5  82.0 - 102.0 FL Final    MCH 10/16/2023 32.6  26.1 - 32.9 PG Final    MCHC 10/16/2023 34.2  31.4 - 35.0 g/dL Final    RDW 10/16/2023 12.7  11.9 - 14.6 % Final    Platelets 18/00/8578 185  150 - 450 K/uL Final    MPV 10/16/2023 10.3  9.4 - 12.3 FL Final    nRBC 10/16/2023 0.00  0.0 - 0.2 K/uL Final    **Note: Absolute NRBC parameter is now reported with Hemogram**    Neutrophils % 10/16/2023 44  43 - 78 % Final    Lymphocytes % 10/16/2023 40  13 - 44 % Final    Monocytes % 10/16/2023 14 (H)  4.0 - 12.0 % Final    Eosinophils % 10/16/2023 1  0.5 - 7.8 % Final    Basophils % 10/16/2023 1  0.0 - 2.0 % Final    Immature Granulocytes 10/16/2023 0  0.0 - 5.0 % Final    Neutrophils Absolute 10/16/2023 3.4  1.7 - 8.2 K/UL Final    Lymphocytes Absolute 10/16/2023 3.1  0.5 - 4.6 K/UL Final    Monocytes Absolute 10/16/2023 1.1  0.1 - 1.3 K/UL Final    Eosinophils Absolute 10/16/2023 0.1  0.0 - 0.8 K/UL Final    Basophils Absolute 10/16/2023 0.0  0.0 - 0.2 K/UL Final    Absolute Immature Granulocyte 10/16/2023 0.0  0.0 - 0.5 K/UL Final    Differential Type 10/16/2023 AUTOMATED    Final    Sodium 10/16/2023 142  133 - 143 mmol/L Final    Potassium 10/16/2023 4.6  3.5 - 5.1 mmol/L Final    Chloride 10/16/2023 108  101 - 110 mmol/L Final    CO2

## 2023-12-13 ENCOUNTER — OFFICE VISIT (OUTPATIENT)
Dept: ONCOLOGY | Age: 73
End: 2023-12-13
Payer: MEDICARE

## 2023-12-13 ENCOUNTER — HOSPITAL ENCOUNTER (OUTPATIENT)
Dept: LAB | Age: 73
Discharge: HOME | End: 2023-12-16
Payer: MEDICARE

## 2023-12-13 VITALS
OXYGEN SATURATION: 99 % | WEIGHT: 172.3 LBS | DIASTOLIC BLOOD PRESSURE: 60 MMHG | SYSTOLIC BLOOD PRESSURE: 101 MMHG | HEART RATE: 71 BPM | BODY MASS INDEX: 25.52 KG/M2 | RESPIRATION RATE: 16 BRPM | TEMPERATURE: 97.6 F | HEIGHT: 69 IN

## 2023-12-13 DIAGNOSIS — Z03.89 ENCOUNTER FOR OBSERVATION FOR OTHER SUSPECTED DISEASES AND CONDITIONS RULED OUT: ICD-10-CM

## 2023-12-13 DIAGNOSIS — E83.110 HEREDITARY HEMOCHROMATOSIS (HCC): ICD-10-CM

## 2023-12-13 DIAGNOSIS — E83.110 HEREDITARY HEMOCHROMATOSIS (HCC): Primary | ICD-10-CM

## 2023-12-13 LAB
BASOPHILS # BLD: 0.1 K/UL (ref 0–0.2)
BASOPHILS NFR BLD: 1 % (ref 0–2)
DIFFERENTIAL METHOD BLD: ABNORMAL
EOSINOPHIL # BLD: 0.1 K/UL (ref 0–0.8)
EOSINOPHIL NFR BLD: 2 % (ref 0.5–7.8)
ERYTHROCYTE [DISTWIDTH] IN BLOOD BY AUTOMATED COUNT: 13.6 % (ref 11.9–14.6)
FERRITIN SERPL-MCNC: 136 NG/ML (ref 8–388)
HCT VFR BLD AUTO: 36.5 % (ref 41.1–50.3)
HGB BLD-MCNC: 12 G/DL (ref 13.6–17.2)
IMM GRANULOCYTES # BLD AUTO: 0 K/UL (ref 0–0.5)
IMM GRANULOCYTES NFR BLD AUTO: 0 % (ref 0–5)
IRON SATN MFR SERPL: 37 %
IRON SERPL-MCNC: 98 UG/DL (ref 35–150)
LYMPHOCYTES # BLD: 3 K/UL (ref 0.5–4.6)
LYMPHOCYTES NFR BLD: 43 % (ref 13–44)
MCH RBC QN AUTO: 33.2 PG (ref 26.1–32.9)
MCHC RBC AUTO-ENTMCNC: 32.9 G/DL (ref 31.4–35)
MCV RBC AUTO: 101.1 FL (ref 82–102)
MONOCYTES # BLD: 0.7 K/UL (ref 0.1–1.3)
MONOCYTES NFR BLD: 10 % (ref 4–12)
NEUTS SEG # BLD: 3 K/UL (ref 1.7–8.2)
NEUTS SEG NFR BLD: 44 % (ref 43–78)
NRBC # BLD: 0 K/UL (ref 0–0.2)
PLATELET # BLD AUTO: 195 K/UL (ref 150–450)
PMV BLD AUTO: 10.4 FL (ref 9.4–12.3)
RBC # BLD AUTO: 3.61 M/UL (ref 4.23–5.6)
TIBC SERPL-MCNC: 262 UG/DL (ref 250–450)
WBC # BLD AUTO: 6.9 K/UL (ref 4.3–11.1)

## 2023-12-13 PROCEDURE — 1123F ACP DISCUSS/DSCN MKR DOCD: CPT

## 2023-12-13 PROCEDURE — 36415 COLL VENOUS BLD VENIPUNCTURE: CPT

## 2023-12-13 PROCEDURE — 1036F TOBACCO NON-USER: CPT

## 2023-12-13 PROCEDURE — 85025 COMPLETE CBC W/AUTO DIFF WBC: CPT

## 2023-12-13 PROCEDURE — 3017F COLORECTAL CA SCREEN DOC REV: CPT

## 2023-12-13 PROCEDURE — 83550 IRON BINDING TEST: CPT

## 2023-12-13 PROCEDURE — G8417 CALC BMI ABV UP PARAM F/U: HCPCS

## 2023-12-13 PROCEDURE — G8427 DOCREV CUR MEDS BY ELIG CLIN: HCPCS

## 2023-12-13 PROCEDURE — 99214 OFFICE O/P EST MOD 30 MIN: CPT

## 2023-12-13 PROCEDURE — 82728 ASSAY OF FERRITIN: CPT

## 2023-12-13 PROCEDURE — 83540 ASSAY OF IRON: CPT

## 2023-12-13 PROCEDURE — G8484 FLU IMMUNIZE NO ADMIN: HCPCS

## 2023-12-13 ASSESSMENT — PATIENT HEALTH QUESTIONNAIRE - PHQ9
1. LITTLE INTEREST OR PLEASURE IN DOING THINGS: 0
SUM OF ALL RESPONSES TO PHQ QUESTIONS 1-9: 0
SUM OF ALL RESPONSES TO PHQ QUESTIONS 1-9: 0
2. FEELING DOWN, DEPRESSED OR HOPELESS: 0
SUM OF ALL RESPONSES TO PHQ QUESTIONS 1-9: 0
SUM OF ALL RESPONSES TO PHQ QUESTIONS 1-9: 0
SUM OF ALL RESPONSES TO PHQ9 QUESTIONS 1 & 2: 0

## 2023-12-14 NOTE — PROGRESS NOTES
ferritin . He understands and agrees to testing today. He returns today for follow-up. Since last seen, he was phlebotomized at [facility] weekly X4. He has noticed a little less energy but not prohibitory. No sx of bleeding. He has some pain in his hips and this may be d/t arthritis. Labs reviewed and today's Hgb is 12 and Ferritin is down to 136. Will schedule him for FU to monitor labs. He has a fear of needles but asks if he should go ahead and volunteer to give blood regularly. I explained that is fine and that the [facility] would not let him donate blood if his Hgb did not meet criteria. He is going to think about this. All questions answered to the best of my ability. He will call our office with any questions/concerns. He will return 1/17/24 for next FU.            BENNY Glover [facility]  [address] [place] [address]  Office : [phone]  Fax : [phone]

## 2024-01-16 DIAGNOSIS — E83.110 HEREDITARY HEMOCHROMATOSIS (HCC): Primary | ICD-10-CM

## 2024-01-16 DIAGNOSIS — R89.7 ABNORMAL HISTOLOGICAL FINDINGS IN SPECIMENS FROM OTHER ORGANS, SYSTEMS AND TISSUES: ICD-10-CM

## 2024-01-17 ENCOUNTER — HOSPITAL ENCOUNTER (OUTPATIENT)
Dept: LAB | Age: 74
Discharge: HOME | End: 2024-01-20
Payer: MEDICARE

## 2024-01-17 ENCOUNTER — OFFICE VISIT (OUTPATIENT)
Dept: ONCOLOGY | Age: 74
End: 2024-01-17
Payer: MEDICARE

## 2024-01-17 VITALS
WEIGHT: 170.6 LBS | HEIGHT: 69 IN | RESPIRATION RATE: 16 BRPM | BODY MASS INDEX: 25.27 KG/M2 | SYSTOLIC BLOOD PRESSURE: 134 MMHG | HEART RATE: 60 BPM | TEMPERATURE: 97.7 F | OXYGEN SATURATION: 95 % | DIASTOLIC BLOOD PRESSURE: 85 MMHG

## 2024-01-17 DIAGNOSIS — E83.110 HEREDITARY HEMOCHROMATOSIS (HCC): Primary | ICD-10-CM

## 2024-01-17 DIAGNOSIS — E83.110 HEREDITARY HEMOCHROMATOSIS (HCC): ICD-10-CM

## 2024-01-17 DIAGNOSIS — R89.7 ABNORMAL HISTOLOGICAL FINDINGS IN SPECIMENS FROM OTHER ORGANS, SYSTEMS AND TISSUES: ICD-10-CM

## 2024-01-17 LAB
ALBUMIN SERPL-MCNC: 3.3 G/DL (ref 3.2–4.6)
ALBUMIN/GLOB SERPL: 1 (ref 0.4–1.6)
ALP SERPL-CCNC: 64 U/L (ref 50–136)
ALT SERPL-CCNC: 21 U/L (ref 12–65)
ANION GAP SERPL CALC-SCNC: 2 MMOL/L (ref 2–11)
AST SERPL-CCNC: 14 U/L (ref 15–37)
BASOPHILS # BLD: 0 K/UL (ref 0–0.2)
BASOPHILS NFR BLD: 1 % (ref 0–2)
BILIRUB SERPL-MCNC: 0.3 MG/DL (ref 0.2–1.1)
BUN SERPL-MCNC: 18 MG/DL (ref 8–23)
CALCIUM SERPL-MCNC: 8.3 MG/DL (ref 8.3–10.4)
CHLORIDE SERPL-SCNC: 109 MMOL/L (ref 103–113)
CO2 SERPL-SCNC: 31 MMOL/L (ref 21–32)
CREAT SERPL-MCNC: 1.1 MG/DL (ref 0.8–1.5)
DIFFERENTIAL METHOD BLD: NORMAL
EOSINOPHIL # BLD: 0.1 K/UL (ref 0–0.8)
EOSINOPHIL NFR BLD: 1 % (ref 0.5–7.8)
ERYTHROCYTE [DISTWIDTH] IN BLOOD BY AUTOMATED COUNT: 11.9 % (ref 11.9–14.6)
FERRITIN SERPL-MCNC: 97 NG/ML (ref 8–388)
GLOBULIN SER CALC-MCNC: 3.3 G/DL (ref 2.8–4.5)
GLUCOSE SERPL-MCNC: 101 MG/DL (ref 65–100)
HCT VFR BLD AUTO: 45.6 % (ref 41.1–50.3)
HGB BLD-MCNC: 15.2 G/DL (ref 13.6–17.2)
IMM GRANULOCYTES # BLD AUTO: 0 K/UL (ref 0–0.5)
IMM GRANULOCYTES NFR BLD AUTO: 0 % (ref 0–5)
IRON SATN MFR SERPL: 40 %
IRON SERPL-MCNC: 104 UG/DL (ref 35–150)
LYMPHOCYTES # BLD: 3.3 K/UL (ref 0.5–4.6)
LYMPHOCYTES NFR BLD: 44 % (ref 13–44)
MCH RBC QN AUTO: 32.6 PG (ref 26.1–32.9)
MCHC RBC AUTO-ENTMCNC: 33.3 G/DL (ref 31.4–35)
MCV RBC AUTO: 97.9 FL (ref 82–102)
MONOCYTES # BLD: 0.7 K/UL (ref 0.1–1.3)
MONOCYTES NFR BLD: 10 % (ref 4–12)
NEUTS SEG # BLD: 3.2 K/UL (ref 1.7–8.2)
NEUTS SEG NFR BLD: 44 % (ref 43–78)
NRBC # BLD: 0 K/UL (ref 0–0.2)
PLATELET # BLD AUTO: 207 K/UL (ref 150–450)
PMV BLD AUTO: 10.4 FL (ref 9.4–12.3)
POTASSIUM SERPL-SCNC: 4.4 MMOL/L (ref 3.5–5.1)
PROT SERPL-MCNC: 6.6 G/DL (ref 6.3–8.2)
RBC # BLD AUTO: 4.66 M/UL (ref 4.23–5.6)
SODIUM SERPL-SCNC: 142 MMOL/L (ref 136–146)
TIBC SERPL-MCNC: 258 UG/DL (ref 250–450)
WBC # BLD AUTO: 7.4 K/UL (ref 4.3–11.1)

## 2024-01-17 PROCEDURE — 99214 OFFICE O/P EST MOD 30 MIN: CPT

## 2024-01-17 PROCEDURE — 82728 ASSAY OF FERRITIN: CPT

## 2024-01-17 PROCEDURE — 3017F COLORECTAL CA SCREEN DOC REV: CPT

## 2024-01-17 PROCEDURE — 1123F ACP DISCUSS/DSCN MKR DOCD: CPT

## 2024-01-17 PROCEDURE — G8417 CALC BMI ABV UP PARAM F/U: HCPCS

## 2024-01-17 PROCEDURE — 85025 COMPLETE CBC W/AUTO DIFF WBC: CPT

## 2024-01-17 PROCEDURE — 83540 ASSAY OF IRON: CPT

## 2024-01-17 PROCEDURE — G8484 FLU IMMUNIZE NO ADMIN: HCPCS

## 2024-01-17 PROCEDURE — 36415 COLL VENOUS BLD VENIPUNCTURE: CPT

## 2024-01-17 PROCEDURE — 80053 COMPREHEN METABOLIC PANEL: CPT

## 2024-01-17 PROCEDURE — 1036F TOBACCO NON-USER: CPT

## 2024-01-17 PROCEDURE — 83550 IRON BINDING TEST: CPT

## 2024-01-17 PROCEDURE — G8427 DOCREV CUR MEDS BY ELIG CLIN: HCPCS

## 2024-01-17 ASSESSMENT — PATIENT HEALTH QUESTIONNAIRE - PHQ9
1. LITTLE INTEREST OR PLEASURE IN DOING THINGS: 0
SUM OF ALL RESPONSES TO PHQ QUESTIONS 1-9: 0
SUM OF ALL RESPONSES TO PHQ9 QUESTIONS 1 & 2: 0
2. FEELING DOWN, DEPRESSED OR HOPELESS: 0
SUM OF ALL RESPONSES TO PHQ QUESTIONS 1-9: 0

## 2024-01-17 NOTE — PROGRESS NOTES
[facility]: Office Visit Established Patient    Chief Complaint:    Chief Complaint   Patient presents with    Follow-up         History of Present Illness:  Mr. Sanderson is a 73 y.o. male who presents today for follow-up regarding elevated ferritin.  This was done at the sleep center because of RLS, it was 440 on routine labs.  Repeat labs in September 2023 showed a TSAT 46% and ferritin of 334.  He is now referred to hematology for evaluation of possible iron overload.  We will check HFE gene mutation analysis and repeat his TSAT/ferritin, if he is negative for HH I would not recommend phlebotomy, but if he has a mutation I would consider phlebotomy to a goal of ferritin .  He understands and agrees to testing today.      He returns today for follow-up. Overall, he has been doing very well. His energy level has improved but not yet to baseline. He has been able to do more yard work. He has joint pains that occur mainly at night. He does not like to take medications for this and states that getting out of bed and standing up relieves the pains. No sx of bleeding. Ferritin 97 today. Hgb 15.2. He has not had any more phlebotomies since last seen. All questions answered to the best of my ability. He will return in one month for FU with Dr. Gomez.     Review of Systems:  Constitutional: Mild fatigue.   HENT: Negative.   Eyes: Negative.   Respiratory: Negative.   Cardiovascular: Negative.   Gastrointestinal: Negative.   Genitourinary: Negative.   Musculoskeletal: Pain in hips.   Skin: Negative.   Neurological: Negative.   Endo/Heme/Allergies: Negative.   Psychiatric/Behavioral: Negative.   All other systems reviewed and are negative.     No Known Allergies  Past Medical History:   Diagnosis Date    CKD (chronic kidney disease)     Diverticulosis     Essential tremor     Glucose intolerance (impaired glucose tolerance)     History of stress test >25 years ago    WNL    Hyperlipemia

## 2024-02-15 DIAGNOSIS — Z03.89 ENCOUNTER FOR OBSERVATION FOR OTHER SUSPECTED DISEASES AND CONDITIONS RULED OUT: ICD-10-CM

## 2024-02-15 DIAGNOSIS — E83.110 HEREDITARY HEMOCHROMATOSIS (HCC): Primary | ICD-10-CM

## 2024-02-15 DIAGNOSIS — E83.19 IRON OVERLOAD: ICD-10-CM

## 2024-02-21 ENCOUNTER — HOSPITAL ENCOUNTER (OUTPATIENT)
Dept: LAB | Age: 74
Discharge: HOME | End: 2024-02-24
Payer: MEDICARE

## 2024-02-21 ENCOUNTER — OFFICE VISIT (OUTPATIENT)
Dept: ONCOLOGY | Age: 74
End: 2024-02-21
Payer: MEDICARE

## 2024-02-21 ENCOUNTER — TELEPHONE (OUTPATIENT)
Dept: ONCOLOGY | Age: 74
End: 2024-02-21

## 2024-02-21 VITALS
WEIGHT: 168.9 LBS | BODY MASS INDEX: 25.02 KG/M2 | RESPIRATION RATE: 18 BRPM | HEIGHT: 69 IN | TEMPERATURE: 97.6 F | DIASTOLIC BLOOD PRESSURE: 68 MMHG | SYSTOLIC BLOOD PRESSURE: 107 MMHG | HEART RATE: 64 BPM | OXYGEN SATURATION: 97 %

## 2024-02-21 DIAGNOSIS — D46.1: ICD-10-CM

## 2024-02-21 DIAGNOSIS — Z03.89 ENCOUNTER FOR OBSERVATION FOR OTHER SUSPECTED DISEASES AND CONDITIONS RULED OUT: ICD-10-CM

## 2024-02-21 DIAGNOSIS — E83.19 IRON OVERLOAD: ICD-10-CM

## 2024-02-21 DIAGNOSIS — E83.110 HEREDITARY HEMOCHROMATOSIS (HCC): Primary | ICD-10-CM

## 2024-02-21 DIAGNOSIS — E83.110 HEREDITARY HEMOCHROMATOSIS (HCC): ICD-10-CM

## 2024-02-21 LAB
ALBUMIN SERPL-MCNC: 3.5 G/DL (ref 3.2–4.6)
ALBUMIN/GLOB SERPL: 1 (ref 0.4–1.6)
ALP SERPL-CCNC: 59 U/L (ref 50–136)
ALT SERPL-CCNC: 20 U/L (ref 12–65)
ANION GAP SERPL CALC-SCNC: 6 MMOL/L (ref 2–11)
AST SERPL-CCNC: 12 U/L (ref 15–37)
BASOPHILS # BLD: 0.1 K/UL (ref 0–0.2)
BASOPHILS NFR BLD: 1 % (ref 0–2)
BILIRUB SERPL-MCNC: 0.4 MG/DL (ref 0.2–1.1)
BUN SERPL-MCNC: 16 MG/DL (ref 8–23)
CALCIUM SERPL-MCNC: 8.6 MG/DL (ref 8.3–10.4)
CHLORIDE SERPL-SCNC: 106 MMOL/L (ref 103–113)
CO2 SERPL-SCNC: 27 MMOL/L (ref 21–32)
CREAT SERPL-MCNC: 0.9 MG/DL (ref 0.8–1.5)
DIFFERENTIAL METHOD BLD: ABNORMAL
EOSINOPHIL # BLD: 0.1 K/UL (ref 0–0.8)
EOSINOPHIL NFR BLD: 1 % (ref 0.5–7.8)
ERYTHROCYTE [DISTWIDTH] IN BLOOD BY AUTOMATED COUNT: 11.8 % (ref 11.9–14.6)
FERRITIN SERPL-MCNC: 103 NG/ML (ref 8–388)
GLOBULIN SER CALC-MCNC: 3.5 G/DL (ref 2.8–4.5)
GLUCOSE SERPL-MCNC: 91 MG/DL (ref 65–100)
HCT VFR BLD AUTO: 48.5 % (ref 41.1–50.3)
HGB BLD-MCNC: 16.8 G/DL (ref 13.6–17.2)
IMM GRANULOCYTES # BLD AUTO: 0 K/UL (ref 0–0.5)
IMM GRANULOCYTES NFR BLD AUTO: 0 % (ref 0–5)
IRON SATN MFR SERPL: 38 %
IRON SERPL-MCNC: 101 UG/DL (ref 35–150)
LYMPHOCYTES # BLD: 3.8 K/UL (ref 0.5–4.6)
LYMPHOCYTES NFR BLD: 49 % (ref 13–44)
MCH RBC QN AUTO: 32.2 PG (ref 26.1–32.9)
MCHC RBC AUTO-ENTMCNC: 34.6 G/DL (ref 31.4–35)
MCV RBC AUTO: 93.1 FL (ref 82–102)
MONOCYTES # BLD: 0.8 K/UL (ref 0.1–1.3)
MONOCYTES NFR BLD: 10 % (ref 4–12)
NEUTS SEG # BLD: 3 K/UL (ref 1.7–8.2)
NEUTS SEG NFR BLD: 39 % (ref 43–78)
NRBC # BLD: 0 K/UL (ref 0–0.2)
PLATELET # BLD AUTO: 204 K/UL (ref 150–450)
PMV BLD AUTO: 10.7 FL (ref 9.4–12.3)
POTASSIUM SERPL-SCNC: 4.2 MMOL/L (ref 3.5–5.1)
PROT SERPL-MCNC: 7 G/DL (ref 6.3–8.2)
RBC # BLD AUTO: 5.21 M/UL (ref 4.23–5.6)
SODIUM SERPL-SCNC: 139 MMOL/L (ref 136–146)
TIBC SERPL-MCNC: 263 UG/DL (ref 250–450)
WBC # BLD AUTO: 7.7 K/UL (ref 4.3–11.1)

## 2024-02-21 PROCEDURE — G8428 CUR MEDS NOT DOCUMENT: HCPCS | Performed by: INTERNAL MEDICINE

## 2024-02-21 PROCEDURE — 1123F ACP DISCUSS/DSCN MKR DOCD: CPT | Performed by: INTERNAL MEDICINE

## 2024-02-21 PROCEDURE — 99214 OFFICE O/P EST MOD 30 MIN: CPT | Performed by: INTERNAL MEDICINE

## 2024-02-21 PROCEDURE — G8420 CALC BMI NORM PARAMETERS: HCPCS | Performed by: INTERNAL MEDICINE

## 2024-02-21 PROCEDURE — 3017F COLORECTAL CA SCREEN DOC REV: CPT | Performed by: INTERNAL MEDICINE

## 2024-02-21 PROCEDURE — G8484 FLU IMMUNIZE NO ADMIN: HCPCS | Performed by: INTERNAL MEDICINE

## 2024-02-21 PROCEDURE — 83550 IRON BINDING TEST: CPT

## 2024-02-21 PROCEDURE — 36415 COLL VENOUS BLD VENIPUNCTURE: CPT

## 2024-02-21 PROCEDURE — 80053 COMPREHEN METABOLIC PANEL: CPT

## 2024-02-21 PROCEDURE — 82728 ASSAY OF FERRITIN: CPT

## 2024-02-21 PROCEDURE — 85025 COMPLETE CBC W/AUTO DIFF WBC: CPT

## 2024-02-21 PROCEDURE — 83540 ASSAY OF IRON: CPT

## 2024-02-21 PROCEDURE — 1036F TOBACCO NON-USER: CPT | Performed by: INTERNAL MEDICINE

## 2024-02-21 NOTE — PATIENT INSTRUCTIONS
Patient Instructions from Today's Visit    Reason for Visit:  Follow up Elevated Ferritin     Diagnosis Information:  https://www.cancer.net/about-us/asco-answers-patient-education-materials/mlwh-cqhngbb-gwum-sheets    Plan:  We are still waiting on lab results from today. We will check ferritin today but as long as it remains between , we typically recommend continuing phlebotomy once every 3-4 months as needed.     Follow Up:  Follow up in 3 months.    Recent Lab Results:  No visits with results within 3 Day(s) from this visit.   Latest known visit with results is:   Hospital Outpatient Visit on 01/17/2024   Component Date Value Ref Range Status    WBC 01/17/2024 7.4  4.3 - 11.1 K/uL Final    RBC 01/17/2024 4.66  4.23 - 5.6 M/uL Final    Hemoglobin 01/17/2024 15.2  13.6 - 17.2 g/dL Final    Hematocrit 01/17/2024 45.6  41.1 - 50.3 % Final    MCV 01/17/2024 97.9  82.0 - 102.0 FL Final    MCH 01/17/2024 32.6  26.1 - 32.9 PG Final    MCHC 01/17/2024 33.3  31.4 - 35.0 g/dL Final    RDW 01/17/2024 11.9  11.9 - 14.6 % Final    Platelets 01/17/2024 207  150 - 450 K/uL Final    MPV 01/17/2024 10.4  9.4 - 12.3 FL Final    nRBC 01/17/2024 0.00  0.0 - 0.2 K/uL Final    **Note: Absolute NRBC parameter is now reported with Hemogram**    Neutrophils % 01/17/2024 44  43 - 78 % Final    Lymphocytes % 01/17/2024 44  13 - 44 % Final    Monocytes % 01/17/2024 10  4.0 - 12.0 % Final    Eosinophils % 01/17/2024 1  0.5 - 7.8 % Final    Basophils % 01/17/2024 1  0.0 - 2.0 % Final    Immature Granulocytes 01/17/2024 0  0.0 - 5.0 % Final    Neutrophils Absolute 01/17/2024 3.2  1.7 - 8.2 K/UL Final    Lymphocytes Absolute 01/17/2024 3.3  0.5 - 4.6 K/UL Final    Monocytes Absolute 01/17/2024 0.7  0.1 - 1.3 K/UL Final    Eosinophils Absolute 01/17/2024 0.1  0.0 - 0.8 K/UL Final    Basophils Absolute 01/17/2024 0.0  0.0 - 0.2 K/UL Final    Absolute Immature Granulocyte 01/17/2024 0.0  0.0 - 0.5 K/UL Final    Differential Type

## 2024-02-21 NOTE — PROGRESS NOTES
[facility]: Office Visit Established Patient    Chief Complaint:    Chief Complaint   Patient presents with    Follow-up         History of Present Illness:  Mr. Sanderson is a 73 y.o. male who presents today for follow-up regarding hereditary hemochromatosis.  He had labs done at the sleep center because of RLS, ferritin was 440 on routine labs.  Repeat labs in September 2023 showed a TSAT 46% and ferritin of 334.  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  It is encouraging that he is 73 and has never had any iron overload symptoms, but I would still favor phlebotomy to decrease his risk of end-organ damage.  This can be done weekly for 4 weeks, then recheck CBC and ferritin with a goal of  (and minimal anemia).      He returns today for follow-up.  Overall, he has been doing well.  His energy level has slightly decreased after phlebotomy, he is not as active in the winter months so he feels like this may be a contributor as well.  He has joint pains in the hips and knees that seem to have increased, also occasional bone pains worse in the legs.  No other new issues.  It has been about 2.5 months since his last phlebotomy as his ferritin in January was at goal (97).     Review of Systems:  Constitutional: Positive for fatigue.   HENT: Negative.   Eyes: Negative.   Respiratory: Negative.   Cardiovascular: Negative.   Gastrointestinal: Negative.   Genitourinary: Negative.   Musculoskeletal: Positive for joint pain (hips, knees).   Skin: Negative.   Neurological: Negative.   Endo/Heme/Allergies: Negative.   Psychiatric/Behavioral: Negative.   All other systems reviewed and are negative.     No Known Allergies  Past Medical History:   Diagnosis Date    CKD (chronic kidney disease)     Diverticulosis     Essential tremor     Glucose intolerance (impaired glucose tolerance)     History of stress test >25 years ago    WNL

## 2024-02-21 NOTE — TELEPHONE ENCOUNTER
RN called patient to review lab results from today. Ferritin level 103 today. RN reviewed with Dr. Gomez and per Dr. Gomez's orders, patient is to proceed with phlebotomy x 1 in the next couple weeks at patient's convenience and then we will continue with 3 month follow up to recheck labs as scheduled. Patient verbalized understanding, no further questions or concerns at this time.

## 2024-02-24 NOTE — PROGRESS NOTES
Issa Sanderson (:  1950) is a 74 y.o. male,Established patient, here for evaluation of the following chief complaint(s):  6 Month Follow-Up, Back Pain (Back pain/sore hips), and Medication Refill         ASSESSMENT/PLAN:  1. Dyslipidemia  Lipid panel from 2023 reviewed with values at goal  Continue current dose of rosuvastatin    2. Hereditary hemochromatosis (HCC)  Elevated ferritin of 440 on 2023  Positive gene analysis on 10/16/2023  Labs from 2024 with iron saturation of 38%, ferritin of 103  Follow-up with hematology for further monitoring and therapeutic phlebotomies as needed    3. Obstructive sleep apnea  Sleep study on 2023 with apnea-hypopnea index of 9.2, lowest desaturation 84%   Previously discussed with patient treatment options including positive pressure ventilation versus oral appliance.  However patient chose to partake in positional therapy  We discussed treatment options for sleep apnea today explaining it is likely contributing to his poor quality of sleep and daytime fatigue.  Also discussed sleep hygiene practices.  Recommend over-the-counter melatonin to assist with issues with getting to sleep    4. Erectile dysfunction, unspecified erectile dysfunction type  Continue as needed sildenafil    - sildenafil (VIAGRA) 100 MG tablet; Take 1/2-1 whole tablet by mouth 30 to 60 minutes prior to sexual activity as needed for ED.  Max of 1 tablet per 24 hours.  Go to ER if erection lasts more than 4 hours.  Dispense: 10 tablet; Refill: 5    5. Encounter for immunization  Up-to-date Prevnar 20 vaccination administered in office today after discussion with patient    - Pneumococcal, PCV20, PREVNAR 20, (age 6w+), IM, PF    6. Chronic low back pain with left-sided sciatica, unspecified back pain laterality  Suspect lumbar spondylosis and degenerative disc disease with possible sciatic involvement  Symptoms not severe enough where he needs medication.  Home exercises

## 2024-02-28 ENCOUNTER — OFFICE VISIT (OUTPATIENT)
Dept: INTERNAL MEDICINE CLINIC | Facility: CLINIC | Age: 74
End: 2024-02-28
Payer: MEDICARE

## 2024-02-28 VITALS
DIASTOLIC BLOOD PRESSURE: 70 MMHG | SYSTOLIC BLOOD PRESSURE: 110 MMHG | HEIGHT: 69 IN | BODY MASS INDEX: 24.88 KG/M2 | RESPIRATION RATE: 16 BRPM | TEMPERATURE: 98.1 F | WEIGHT: 168 LBS | OXYGEN SATURATION: 94 % | HEART RATE: 68 BPM

## 2024-02-28 DIAGNOSIS — M25.551 CHRONIC PAIN OF BOTH HIPS: ICD-10-CM

## 2024-02-28 DIAGNOSIS — G89.29 CHRONIC LOW BACK PAIN WITH LEFT-SIDED SCIATICA, UNSPECIFIED BACK PAIN LATERALITY: ICD-10-CM

## 2024-02-28 DIAGNOSIS — Z23 ENCOUNTER FOR IMMUNIZATION: ICD-10-CM

## 2024-02-28 DIAGNOSIS — N52.9 ERECTILE DYSFUNCTION, UNSPECIFIED ERECTILE DYSFUNCTION TYPE: ICD-10-CM

## 2024-02-28 DIAGNOSIS — G89.29 CHRONIC PAIN OF BOTH HIPS: ICD-10-CM

## 2024-02-28 DIAGNOSIS — E78.5 DYSLIPIDEMIA: Primary | ICD-10-CM

## 2024-02-28 DIAGNOSIS — E83.110 HEREDITARY HEMOCHROMATOSIS (HCC): ICD-10-CM

## 2024-02-28 DIAGNOSIS — G47.33 OBSTRUCTIVE SLEEP APNEA: ICD-10-CM

## 2024-02-28 DIAGNOSIS — M54.42 CHRONIC LOW BACK PAIN WITH LEFT-SIDED SCIATICA, UNSPECIFIED BACK PAIN LATERALITY: ICD-10-CM

## 2024-02-28 DIAGNOSIS — M25.552 CHRONIC PAIN OF BOTH HIPS: ICD-10-CM

## 2024-02-28 PROBLEM — E61.1 IRON DEFICIENCY: Status: RESOLVED | Noted: 2023-04-28 | Resolved: 2024-02-28

## 2024-02-28 PROCEDURE — 1036F TOBACCO NON-USER: CPT | Performed by: STUDENT IN AN ORGANIZED HEALTH CARE EDUCATION/TRAINING PROGRAM

## 2024-02-28 PROCEDURE — G0009 ADMIN PNEUMOCOCCAL VACCINE: HCPCS | Performed by: STUDENT IN AN ORGANIZED HEALTH CARE EDUCATION/TRAINING PROGRAM

## 2024-02-28 PROCEDURE — G8484 FLU IMMUNIZE NO ADMIN: HCPCS | Performed by: STUDENT IN AN ORGANIZED HEALTH CARE EDUCATION/TRAINING PROGRAM

## 2024-02-28 PROCEDURE — G8420 CALC BMI NORM PARAMETERS: HCPCS | Performed by: STUDENT IN AN ORGANIZED HEALTH CARE EDUCATION/TRAINING PROGRAM

## 2024-02-28 PROCEDURE — 1123F ACP DISCUSS/DSCN MKR DOCD: CPT | Performed by: STUDENT IN AN ORGANIZED HEALTH CARE EDUCATION/TRAINING PROGRAM

## 2024-02-28 PROCEDURE — G8427 DOCREV CUR MEDS BY ELIG CLIN: HCPCS | Performed by: STUDENT IN AN ORGANIZED HEALTH CARE EDUCATION/TRAINING PROGRAM

## 2024-02-28 PROCEDURE — 90677 PCV20 VACCINE IM: CPT | Performed by: STUDENT IN AN ORGANIZED HEALTH CARE EDUCATION/TRAINING PROGRAM

## 2024-02-28 PROCEDURE — 99214 OFFICE O/P EST MOD 30 MIN: CPT | Performed by: STUDENT IN AN ORGANIZED HEALTH CARE EDUCATION/TRAINING PROGRAM

## 2024-02-28 RX ORDER — SILDENAFIL 100 MG/1
TABLET, FILM COATED ORAL
Qty: 10 TABLET | Refills: 5 | Status: SHIPPED | OUTPATIENT
Start: 2024-02-28

## 2024-02-28 RX ORDER — SILDENAFIL 50 MG/1
TABLET, FILM COATED ORAL
Qty: 5 TABLET | Refills: 5 | Status: CANCELLED | OUTPATIENT
Start: 2024-02-28

## 2024-02-28 ASSESSMENT — ENCOUNTER SYMPTOMS
ABDOMINAL PAIN: 0
BACK PAIN: 1
SHORTNESS OF BREATH: 0

## 2024-04-30 SDOH — ECONOMIC STABILITY: FOOD INSECURITY: WITHIN THE PAST 12 MONTHS, YOU WORRIED THAT YOUR FOOD WOULD RUN OUT BEFORE YOU GOT MONEY TO BUY MORE.: NEVER TRUE

## 2024-04-30 SDOH — ECONOMIC STABILITY: FOOD INSECURITY: WITHIN THE PAST 12 MONTHS, THE FOOD YOU BOUGHT JUST DIDN'T LAST AND YOU DIDN'T HAVE MONEY TO GET MORE.: NEVER TRUE

## 2024-04-30 SDOH — ECONOMIC STABILITY: INCOME INSECURITY: HOW HARD IS IT FOR YOU TO PAY FOR THE VERY BASICS LIKE FOOD, HOUSING, MEDICAL CARE, AND HEATING?: NOT HARD AT ALL

## 2024-04-30 SDOH — ECONOMIC STABILITY: TRANSPORTATION INSECURITY
IN THE PAST 12 MONTHS, HAS LACK OF TRANSPORTATION KEPT YOU FROM MEETINGS, WORK, OR FROM GETTING THINGS NEEDED FOR DAILY LIVING?: NO

## 2024-04-30 NOTE — PROGRESS NOTES
Issa Sanderson (:  1950) is a 74 y.o. male,Established patient, here for evaluation of the following chief complaint(s):  Lower Back Pain ( - Trimming a tree and stepped off a ladder awkwardly, then later was working on a car)      Assessment & Plan   1.  Chronic left-sided low back pain with left-sided sciatica  Suspect lumbar spondylosis and degenerative disc disease with possible radiculopathy component  Previously provided home exercises  Activity as tolerated  Avoid lifting more than 20 pounds or repetitive stooping or rotatory movements  Prescription for Medrol Dosepak sent to pharmacy, avoid NSAIDs while taking, okay to supplement with topical therapy and acetaminophen  Patient to alert provider if symptoms fail to improve or worsen, increased lower extremity numbness or paresthesias, foot drop, saddle anesthesia or loss of bowel or bladder control otherwise to seek immediate medical attention    - methylPREDNISolone (MEDROL DOSEPACK) 4 MG tablet; Take by mouth as directed with food.  Dispense: 1 kit; Refill: 0            Return for f/u as scheduled .       Subjective   Patient is a 74-year-old  male who presents to the office today for worsening of low back pain.  Symptoms been worsening since around [facility].  Patient at baseline has left-sided low back pain sometimes with left-sided radiculopathy.  Noticed it was getting worse after doing some yard work and stepping off 1 run of a stepladder.  Also noticed when he was leaning forward to move a light weight filter that he felt like he pulled his back.  Denies spasming.  Pain is in the left low back flank area and goes down the left leg.  Does have some numbness of the left leg with occasional tingling of the left foot.  Has been taking ibuprofen 2 tablets at a time once or twice a day earlier this week also using an inversion table and applying ice with mild relief.  Has been spending more time in the recliner.  No saddle

## 2024-05-03 ENCOUNTER — OFFICE VISIT (OUTPATIENT)
Dept: INTERNAL MEDICINE CLINIC | Facility: CLINIC | Age: 74
End: 2024-05-03

## 2024-05-03 VITALS
TEMPERATURE: 97.9 F | HEART RATE: 58 BPM | SYSTOLIC BLOOD PRESSURE: 124 MMHG | DIASTOLIC BLOOD PRESSURE: 80 MMHG | RESPIRATION RATE: 16 BRPM | BODY MASS INDEX: 24.14 KG/M2 | HEIGHT: 69 IN | WEIGHT: 163 LBS | OXYGEN SATURATION: 97 %

## 2024-05-03 DIAGNOSIS — G89.29 CHRONIC LEFT-SIDED LOW BACK PAIN WITH LEFT-SIDED SCIATICA: Primary | ICD-10-CM

## 2024-05-03 DIAGNOSIS — M54.42 CHRONIC LEFT-SIDED LOW BACK PAIN WITH LEFT-SIDED SCIATICA: Primary | ICD-10-CM

## 2024-05-03 RX ORDER — METHYLPREDNISOLONE 4 MG/1
TABLET ORAL
Qty: 1 KIT | Refills: 0 | Status: SHIPPED | OUTPATIENT
Start: 2024-05-03 | End: 2024-05-09

## 2024-05-03 ASSESSMENT — ENCOUNTER SYMPTOMS
BACK PAIN: 1
ABDOMINAL PAIN: 0

## 2024-05-21 ENCOUNTER — HOSPITAL ENCOUNTER (OUTPATIENT)
Dept: LAB | Age: 74
Discharge: HOME | End: 2024-05-24
Payer: MEDICARE

## 2024-05-21 ENCOUNTER — OFFICE VISIT (OUTPATIENT)
Dept: ONCOLOGY | Age: 74
End: 2024-05-21
Payer: MEDICARE

## 2024-05-21 VITALS
DIASTOLIC BLOOD PRESSURE: 60 MMHG | TEMPERATURE: 97.9 F | RESPIRATION RATE: 20 BRPM | BODY MASS INDEX: 24.19 KG/M2 | WEIGHT: 163.3 LBS | SYSTOLIC BLOOD PRESSURE: 123 MMHG | HEIGHT: 69 IN | HEART RATE: 43 BPM | OXYGEN SATURATION: 96 %

## 2024-05-21 DIAGNOSIS — D46.1: ICD-10-CM

## 2024-05-21 DIAGNOSIS — E83.110 HEREDITARY HEMOCHROMATOSIS (HCC): ICD-10-CM

## 2024-05-21 DIAGNOSIS — E83.110 HEREDITARY HEMOCHROMATOSIS (HCC): Primary | ICD-10-CM

## 2024-05-21 LAB
ALBUMIN SERPL-MCNC: 3.4 G/DL (ref 3.2–4.6)
ALBUMIN/GLOB SERPL: 1.2 (ref 1–1.9)
ALP SERPL-CCNC: 56 U/L (ref 40–129)
ALT SERPL-CCNC: 14 U/L (ref 12–65)
ANION GAP SERPL CALC-SCNC: 7 MMOL/L (ref 9–18)
AST SERPL-CCNC: 18 U/L (ref 15–37)
BASOPHILS # BLD: 0.1 K/UL (ref 0–0.2)
BASOPHILS NFR BLD: 1 % (ref 0–2)
BILIRUB SERPL-MCNC: 0.3 MG/DL (ref 0–1.2)
BUN SERPL-MCNC: 16 MG/DL (ref 8–23)
CALCIUM SERPL-MCNC: 9.4 MG/DL (ref 8.8–10.2)
CHLORIDE SERPL-SCNC: 105 MMOL/L (ref 98–107)
CO2 SERPL-SCNC: 30 MMOL/L (ref 20–28)
CREAT SERPL-MCNC: 0.9 MG/DL (ref 0.8–1.3)
DIFFERENTIAL METHOD BLD: NORMAL
EOSINOPHIL # BLD: 0.1 K/UL (ref 0–0.8)
EOSINOPHIL NFR BLD: 1 % (ref 0.5–7.8)
ERYTHROCYTE [DISTWIDTH] IN BLOOD BY AUTOMATED COUNT: 12.6 % (ref 11.9–14.6)
FERRITIN SERPL-MCNC: 305 NG/ML (ref 8–388)
GLOBULIN SER CALC-MCNC: 2.8 G/DL (ref 2.3–3.5)
GLUCOSE SERPL-MCNC: 128 MG/DL (ref 70–99)
HCT VFR BLD AUTO: 44.9 % (ref 41.1–50.3)
HGB BLD-MCNC: 15.5 G/DL (ref 13.6–17.2)
IMM GRANULOCYTES # BLD AUTO: 0 K/UL (ref 0–0.5)
IMM GRANULOCYTES NFR BLD AUTO: 0 % (ref 0–5)
IRON SATN MFR SERPL: 43 % (ref 20–50)
IRON SERPL-MCNC: 102 UG/DL (ref 35–100)
LYMPHOCYTES # BLD: 3 K/UL (ref 0.5–4.6)
LYMPHOCYTES NFR BLD: 42 % (ref 13–44)
MCH RBC QN AUTO: 32.3 PG (ref 26.1–32.9)
MCHC RBC AUTO-ENTMCNC: 34.5 G/DL (ref 31.4–35)
MCV RBC AUTO: 93.5 FL (ref 82–102)
MONOCYTES # BLD: 0.6 K/UL (ref 0.1–1.3)
MONOCYTES NFR BLD: 9 % (ref 4–12)
NEUTS SEG # BLD: 3.4 K/UL (ref 1.7–8.2)
NEUTS SEG NFR BLD: 47 % (ref 43–78)
NRBC # BLD: 0 K/UL (ref 0–0.2)
PLATELET # BLD AUTO: 201 K/UL (ref 150–450)
PMV BLD AUTO: 10.6 FL (ref 9.4–12.3)
POTASSIUM SERPL-SCNC: 4.2 MMOL/L (ref 3.5–5.1)
PROT SERPL-MCNC: 6.1 G/DL (ref 6.3–8.2)
RBC # BLD AUTO: 4.8 M/UL (ref 4.23–5.6)
SODIUM SERPL-SCNC: 142 MMOL/L (ref 136–145)
TIBC SERPL-MCNC: 238 UG/DL (ref 240–450)
UIBC SERPL-MCNC: 136 UG/DL (ref 112–347)
WBC # BLD AUTO: 7.2 K/UL (ref 4.3–11.1)

## 2024-05-21 PROCEDURE — 83540 ASSAY OF IRON: CPT

## 2024-05-21 PROCEDURE — G8420 CALC BMI NORM PARAMETERS: HCPCS | Performed by: NURSE PRACTITIONER

## 2024-05-21 PROCEDURE — 80053 COMPREHEN METABOLIC PANEL: CPT

## 2024-05-21 PROCEDURE — 1036F TOBACCO NON-USER: CPT | Performed by: NURSE PRACTITIONER

## 2024-05-21 PROCEDURE — 82728 ASSAY OF FERRITIN: CPT

## 2024-05-21 PROCEDURE — 99214 OFFICE O/P EST MOD 30 MIN: CPT | Performed by: NURSE PRACTITIONER

## 2024-05-21 PROCEDURE — 85025 COMPLETE CBC W/AUTO DIFF WBC: CPT

## 2024-05-21 PROCEDURE — 1123F ACP DISCUSS/DSCN MKR DOCD: CPT | Performed by: NURSE PRACTITIONER

## 2024-05-21 PROCEDURE — 83550 IRON BINDING TEST: CPT

## 2024-05-21 PROCEDURE — G8427 DOCREV CUR MEDS BY ELIG CLIN: HCPCS | Performed by: NURSE PRACTITIONER

## 2024-05-21 PROCEDURE — 36415 COLL VENOUS BLD VENIPUNCTURE: CPT

## 2024-05-21 ASSESSMENT — PATIENT HEALTH QUESTIONNAIRE - PHQ9
SUM OF ALL RESPONSES TO PHQ QUESTIONS 1-9: 0
1. LITTLE INTEREST OR PLEASURE IN DOING THINGS: NOT AT ALL
SUM OF ALL RESPONSES TO PHQ QUESTIONS 1-9: 0
SUM OF ALL RESPONSES TO PHQ9 QUESTIONS 1 & 2: 0
SUM OF ALL RESPONSES TO PHQ QUESTIONS 1-9: 0
SUM OF ALL RESPONSES TO PHQ QUESTIONS 1-9: 0
2. FEELING DOWN, DEPRESSED OR HOPELESS: NOT AT ALL

## 2024-05-21 NOTE — PROGRESS NOTES
K/UL    Eosinophils Absolute 0.1 0.0 - 0.8 K/UL    Basophils Absolute 0.1 0.0 - 0.2 K/UL    Immature Granulocytes Absolute 0.0 0.0 - 0.5 K/UL    Differential Type AUTOMATED            Imaging:  No results found.    ASSESSMENT:   Diagnosis Orders   1. Hereditary hemochromatosis (HCC)              Patient Active Problem List   Diagnosis    Primary osteoarthritis of right hip    Diverticulosis of large intestine without hemorrhage    ED (erectile dysfunction)    Hyperlipemia    Osteopenia    Essential tremor    IFG (impaired fasting glucose)    Right hip pain    Suspected sleep apnea    Snoring    Witnessed apneic spells    Hypersomnia    RLS (restless legs syndrome)    Hereditary hemochromatosis (HCC)           PLAN:  Lab studies were personally reviewed.    Pertinent old records were reviewed.     Hereditary hemochromatosis: with elevated ferritin noted incidentally on labs done for RLS, repeat ferritin 334, TSAT 46%.  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  It is encouraging that he is 73 and has never had any iron overload symptoms, but I would still favor phlebotomy to decrease his risk of end-organ damage.  This can be done weekly for 4 weeks, then recheck CBC and ferritin with a goal of  (and minimal anemia).     He is here today for follow-up, last phlebotomy on 2/26.  Since last seen he has been well overall.  Earlier in the month he injured his back so that held him down for a bit, fatigue is ongoing.  Appetite is good and weight is stable.  Denies any recent illness, fevers or infectious symptoms.  Labs reviewed, Hgb 15.5, Hct 44.9, ferritin 305.  He will proceed with repeat phlebotomy.  We will plan to recheck again in 3 months.  All questions answered to the best of my ability.            MAURA Love (Mishelle)-[facility]  [address] [place] [id number]  Office : [phone]  Fax : [phone]

## 2024-07-15 DIAGNOSIS — E78.5 DYSLIPIDEMIA: ICD-10-CM

## 2024-07-15 RX ORDER — ROSUVASTATIN CALCIUM 20 MG/1
20 TABLET, COATED ORAL NIGHTLY
Qty: 90 TABLET | Refills: 2 | OUTPATIENT
Start: 2024-07-15

## 2024-08-21 ENCOUNTER — HOSPITAL ENCOUNTER (OUTPATIENT)
Dept: LAB | Age: 74
Discharge: HOME | End: 2024-08-24

## 2024-08-21 ENCOUNTER — OFFICE VISIT (OUTPATIENT)
Dept: ONCOLOGY | Age: 74
End: 2024-08-21
Payer: MEDICARE

## 2024-08-21 VITALS
HEIGHT: 69 IN | WEIGHT: 165 LBS | RESPIRATION RATE: 16 BRPM | BODY MASS INDEX: 24.44 KG/M2 | TEMPERATURE: 97.7 F | SYSTOLIC BLOOD PRESSURE: 124 MMHG | OXYGEN SATURATION: 97 % | HEART RATE: 50 BPM | DIASTOLIC BLOOD PRESSURE: 74 MMHG

## 2024-08-21 DIAGNOSIS — D46.1: ICD-10-CM

## 2024-08-21 DIAGNOSIS — E83.110 HEREDITARY HEMOCHROMATOSIS (HCC): ICD-10-CM

## 2024-08-21 DIAGNOSIS — E83.110 HEREDITARY HEMOCHROMATOSIS (HCC): Primary | ICD-10-CM

## 2024-08-21 LAB
ALBUMIN SERPL-MCNC: 3.6 G/DL (ref 3.2–4.6)
ALBUMIN/GLOB SERPL: 1.3 (ref 1–1.9)
ALP SERPL-CCNC: 52 U/L (ref 40–129)
ALT SERPL-CCNC: 13 U/L (ref 12–65)
ANION GAP SERPL CALC-SCNC: 9 MMOL/L (ref 9–18)
AST SERPL-CCNC: 17 U/L (ref 15–37)
BASOPHILS # BLD: 0.1 K/UL (ref 0–0.2)
BASOPHILS NFR BLD: 1 % (ref 0–2)
BILIRUB SERPL-MCNC: 0.6 MG/DL (ref 0–1.2)
BUN SERPL-MCNC: 13 MG/DL (ref 8–23)
CALCIUM SERPL-MCNC: 9.4 MG/DL (ref 8.8–10.2)
CHLORIDE SERPL-SCNC: 104 MMOL/L (ref 98–107)
CO2 SERPL-SCNC: 27 MMOL/L (ref 20–28)
CREAT SERPL-MCNC: 0.9 MG/DL (ref 0.8–1.3)
DIFFERENTIAL METHOD BLD: ABNORMAL
EOSINOPHIL # BLD: 0.1 K/UL (ref 0–0.8)
EOSINOPHIL NFR BLD: 1 % (ref 0.5–7.8)
ERYTHROCYTE [DISTWIDTH] IN BLOOD BY AUTOMATED COUNT: 12.2 % (ref 11.9–14.6)
FERRITIN SERPL-MCNC: 111 NG/ML (ref 8–388)
GLOBULIN SER CALC-MCNC: 2.8 G/DL (ref 2.3–3.5)
GLUCOSE SERPL-MCNC: 95 MG/DL (ref 70–99)
HCT VFR BLD AUTO: 46.2 % (ref 41.1–50.3)
HGB BLD-MCNC: 15.8 G/DL (ref 13.6–17.2)
IMM GRANULOCYTES # BLD AUTO: 0 K/UL (ref 0–0.5)
IMM GRANULOCYTES NFR BLD AUTO: 0 % (ref 0–5)
IRON SATN MFR SERPL: 58 % (ref 20–50)
IRON SERPL-MCNC: 144 UG/DL (ref 35–100)
LYMPHOCYTES # BLD: 3.1 K/UL (ref 0.5–4.6)
LYMPHOCYTES NFR BLD: 46 % (ref 13–44)
MCH RBC QN AUTO: 32.6 PG (ref 26.1–32.9)
MCHC RBC AUTO-ENTMCNC: 34.2 G/DL (ref 31.4–35)
MCV RBC AUTO: 95.3 FL (ref 82–102)
MONOCYTES # BLD: 0.7 K/UL (ref 0.1–1.3)
MONOCYTES NFR BLD: 10 % (ref 4–12)
NEUTS SEG # BLD: 2.9 K/UL (ref 1.7–8.2)
NEUTS SEG NFR BLD: 42 % (ref 43–78)
NRBC # BLD: 0 K/UL (ref 0–0.2)
PLATELET # BLD AUTO: 193 K/UL (ref 150–450)
PMV BLD AUTO: 10.6 FL (ref 9.4–12.3)
POTASSIUM SERPL-SCNC: 4.4 MMOL/L (ref 3.5–5.1)
PROT SERPL-MCNC: 6.4 G/DL (ref 6.3–8.2)
RBC # BLD AUTO: 4.85 M/UL (ref 4.23–5.6)
SODIUM SERPL-SCNC: 140 MMOL/L (ref 136–145)
TIBC SERPL-MCNC: 248 UG/DL (ref 240–450)
UIBC SERPL-MCNC: 104 UG/DL (ref 112–347)
WBC # BLD AUTO: 6.9 K/UL (ref 4.3–11.1)

## 2024-08-21 PROCEDURE — 99214 OFFICE O/P EST MOD 30 MIN: CPT | Performed by: INTERNAL MEDICINE

## 2024-08-21 PROCEDURE — 85025 COMPLETE CBC W/AUTO DIFF WBC: CPT

## 2024-08-21 PROCEDURE — 82728 ASSAY OF FERRITIN: CPT

## 2024-08-21 PROCEDURE — 1123F ACP DISCUSS/DSCN MKR DOCD: CPT | Performed by: INTERNAL MEDICINE

## 2024-08-21 PROCEDURE — 83550 IRON BINDING TEST: CPT

## 2024-08-21 PROCEDURE — 83540 ASSAY OF IRON: CPT

## 2024-08-21 PROCEDURE — G8420 CALC BMI NORM PARAMETERS: HCPCS | Performed by: INTERNAL MEDICINE

## 2024-08-21 PROCEDURE — G8428 CUR MEDS NOT DOCUMENT: HCPCS | Performed by: INTERNAL MEDICINE

## 2024-08-21 PROCEDURE — 1036F TOBACCO NON-USER: CPT | Performed by: INTERNAL MEDICINE

## 2024-08-21 PROCEDURE — 36415 COLL VENOUS BLD VENIPUNCTURE: CPT

## 2024-08-21 PROCEDURE — 80053 COMPREHEN METABOLIC PANEL: CPT

## 2024-08-21 ASSESSMENT — PATIENT HEALTH QUESTIONNAIRE - PHQ9
SUM OF ALL RESPONSES TO PHQ9 QUESTIONS 1 & 2: 0
SUM OF ALL RESPONSES TO PHQ QUESTIONS 1-9: 0
1. LITTLE INTEREST OR PLEASURE IN DOING THINGS: NOT AT ALL
2. FEELING DOWN, DEPRESSED OR HOPELESS: NOT AT ALL
SUM OF ALL RESPONSES TO PHQ QUESTIONS 1-9: 0

## 2024-08-21 NOTE — PATIENT INSTRUCTIONS
Patient Information from Today's Visit    Diagnosis: Hereditary Hemochromatosis      Follow Up Instructions: 3 months      Treatment Summary has been discussed and given to patient: N/A      Current Labs:   Hospital Outpatient Visit on 08/21/2024   Component Date Value Ref Range Status    WBC 08/21/2024 6.9  4.3 - 11.1 K/uL Final    RBC 08/21/2024 4.85  4.23 - 5.6 M/uL Final    Hemoglobin 08/21/2024 15.8  13.6 - 17.2 g/dL Final    Hematocrit 08/21/2024 46.2  41.1 - 50.3 % Final    MCV 08/21/2024 95.3  82.0 - 102.0 FL Final    MCH 08/21/2024 32.6  26.1 - 32.9 PG Final    MCHC 08/21/2024 34.2  31.4 - 35.0 g/dL Final    RDW 08/21/2024 12.2  11.9 - 14.6 % Final    Platelets 08/21/2024 193  150 - 450 K/uL Final    MPV 08/21/2024 10.6  9.4 - 12.3 FL Final    nRBC 08/21/2024 0.00  0.0 - 0.2 K/uL Final    **Note: Absolute NRBC parameter is now reported with Hemogram**    Neutrophils % 08/21/2024 42 (L)  43 - 78 % Final    Lymphocytes % 08/21/2024 46 (H)  13 - 44 % Final    Monocytes % 08/21/2024 10  4.0 - 12.0 % Final    Eosinophils % 08/21/2024 1  0.5 - 7.8 % Final    Basophils % 08/21/2024 1  0.0 - 2.0 % Final    Immature Granulocytes % 08/21/2024 0  0.0 - 5.0 % Final    Neutrophils Absolute 08/21/2024 2.9  1.7 - 8.2 K/UL Final    Lymphocytes Absolute 08/21/2024 3.1  0.5 - 4.6 K/UL Final    Monocytes Absolute 08/21/2024 0.7  0.1 - 1.3 K/UL Final    Eosinophils Absolute 08/21/2024 0.1  0.0 - 0.8 K/UL Final    Basophils Absolute 08/21/2024 0.1  0.0 - 0.2 K/UL Final    Immature Granulocytes Absolute 08/21/2024 0.0  0.0 - 0.5 K/UL Final    Differential Type 08/21/2024 AUTOMATED    Final    Sodium 08/21/2024 140  136 - 145 mmol/L Final    Potassium 08/21/2024 4.4  3.5 - 5.1 mmol/L Final    Chloride 08/21/2024 104  98 - 107 mmol/L Final    CO2 08/21/2024 27  20 - 28 mmol/L Final    Anion Gap 08/21/2024 9  9 - 18 mmol/L Final    Glucose 08/21/2024 95  70 - 99 mg/dL Final    Comment: <70 mg/dL Consistent with, but not fully

## 2024-08-21 NOTE — PROGRESS NOTES
Therapeutic phlebotomy orders for phlebotomy every 3 months x 1 year submitted to [facility] at this time per Dr. Gomez verbal order with readback.

## 2024-08-21 NOTE — PROGRESS NOTES
[facility]: Office Visit Established Patient    Chief Complaint:    Chief Complaint   Patient presents with    Follow-up         History of Present Illness:  Mr. Sanderson is a 74 y.o. male who presents today for follow-up regarding hereditary hemochromatosis.  He had labs done at the sleep center because of RLS, ferritin was 440 on routine labs.  Repeat labs in September 2023 showed a TSAT 46% and ferritin of 334.  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  It is encouraging that he is 73 and has never had any iron overload symptoms, but I would still favor phlebotomy to decrease his risk of end-organ damage.  This can be done weekly for 4 weeks, then recheck CBC and ferritin with a goal of  (and minimal anemia).      He is here today for follow-up, last phlebotomy around early June.  Since last seen he has been well overall.  Energy good, tolerating phlebotomy without issue.  Denies any recent illness, fevers or infectious symptoms.     Review of Systems:  Constitutional: Negative.   HENT: Negative.   Eyes: Negative.   Respiratory: Negative.   Cardiovascular: Negative.   Gastrointestinal: Negative.   Genitourinary: Negative.   Musculoskeletal: Negative.   Skin: Negative.   Neurological: Negative.   Endo/Heme/Allergies: Negative.   Psychiatric/Behavioral: Negative.   All other systems reviewed and are negative.     No Known Allergies  Past Medical History:   Diagnosis Date    CKD (chronic kidney disease)     Diverticulosis     Essential tremor     Glucose intolerance (impaired glucose tolerance)     History of stress test >25 years ago    WNL    Hyperlipemia     Onychomycosis     Osteopenia      Past Surgical History:   Procedure Laterality Date    CATARACT REMOVAL Right 02/2018    [facility]    COLONOSCOPY  02/2017    due for repeat in 10 years; Dr. Ely    HEMORRHOID SURGERY  1975    ORTHOPEDIC SURGERY

## 2024-08-24 NOTE — ASSESSMENT & PLAN NOTE
DEXA scan from 3/22/2023 with 10-year fracture risk of 13.4%, hip fracture risk of 4.5%  Continue daily calcium and vitamin D supplementation

## 2024-08-24 NOTE — ASSESSMENT & PLAN NOTE
No significant improvement with trial of sildenafil, switch to trial of tadalafil  Orders:    tadalafil (CIALIS) 20 MG tablet; Take 1/2-1 whole tablet by mouth 30 to 60 minutes prior to sexual activity as needed for ED.  Max of 1 tablet per 24 hours.  Go to ER if erection lasting more than 4 hours.

## 2024-08-24 NOTE — ASSESSMENT & PLAN NOTE
Positive gene analysis on 10/16/2023   Labs from 8/21/2024 with ferritin of 111, iron saturation 58%, hemoglobin 15.8 with hematocrit of 46.2  Currently undergoing maintenance phlebotomy every 3 to 6 months with hematology

## 2024-08-24 NOTE — PROGRESS NOTES
Issa Sanderson (:  1950) is a 74 y.o. male,Established patient, here for evaluation of the following chief complaint(s):  6 Month Follow-Up (Pt is here for a 6 month follow up. )         Assessment & Plan  Dyslipidemia  Lipid panel from 2023 with values at goal  Continue current dose of rosuvastatin    Orders:    Lipid Panel; Future    TSH; Future    T4, Free; Future    rosuvastatin (CRESTOR) 20 MG tablet; Take 0.5 tablets by mouth nightly    Hereditary hemochromatosis (HCC)  Positive gene analysis on 10/16/2023   Labs from 2024 with ferritin of 111, iron saturation 58%, hemoglobin 15.8 with hematocrit of 46.2  Currently undergoing maintenance phlebotomy every 3 to 6 months with hematology           Obstructive sleep apnea  Sleep study on 2023 with apnea-hypopnea index of 9.2, lowest desaturation 84%   Previously discussed with patient treatment options including positive pressure ventilation versus oral appliance.  However patient chose to partake in positional therapy  Continue monitoring         Erectile dysfunction, unspecified erectile dysfunction type  No significant improvement with trial of sildenafil, switch to trial of tadalafil  Orders:    tadalafil (CIALIS) 20 MG tablet; Take 1/2-1 whole tablet by mouth 30 to 60 minutes prior to sexual activity as needed for ED.  Max of 1 tablet per 24 hours.  Go to ER if erection lasting more than 4 hours.    Prostate cancer screening  Discussed with patient indications, risks vs benefits of prostate cancer screening with options including JOSÉ vs PSA testing  Educated patient that abnormalities in screening if present could be attributed to causes other than prostate cancer   However if screening abnormal and based upon degree may warrant urological evaluation for additional workup and testing  Patient verbalizes understanding and desires to proceed with psa testing     Orders:    PSA Screening; Future    Chronic left-sided low back  aspect of great toe) present.      Comments: Decreased but still intact sensation to pinprick along plantar aspect of left great toe  Neurological:      Mental Status: He is alert.   Psychiatric:         Attention and Perception: Attention normal.         Mood and Affect: Mood and affect normal. Mood is not anxious or depressed. Affect is not tearful.         Speech: Speech normal. Speech is not rapid and pressured or slurred.         Behavior: Behavior normal. Behavior is not agitated, aggressive or combative. Behavior is cooperative.         Thought Content: Thought content normal.                  An electronic signature was used to authenticate this note.    --Pascual Lares, DO

## 2024-08-28 ENCOUNTER — OFFICE VISIT (OUTPATIENT)
Dept: INTERNAL MEDICINE CLINIC | Facility: CLINIC | Age: 74
End: 2024-08-28
Payer: MEDICARE

## 2024-08-28 VITALS
BODY MASS INDEX: 24.29 KG/M2 | WEIGHT: 164 LBS | DIASTOLIC BLOOD PRESSURE: 74 MMHG | HEART RATE: 56 BPM | SYSTOLIC BLOOD PRESSURE: 122 MMHG | TEMPERATURE: 97.3 F | OXYGEN SATURATION: 97 % | HEIGHT: 69 IN

## 2024-08-28 DIAGNOSIS — R26.81 UNSTEADINESS ON FEET: ICD-10-CM

## 2024-08-28 DIAGNOSIS — Z12.5 PROSTATE CANCER SCREENING: ICD-10-CM

## 2024-08-28 DIAGNOSIS — E78.5 DYSLIPIDEMIA: Primary | ICD-10-CM

## 2024-08-28 DIAGNOSIS — E83.110 HEREDITARY HEMOCHROMATOSIS (HCC): ICD-10-CM

## 2024-08-28 DIAGNOSIS — M54.50 CHRONIC LEFT-SIDED LOW BACK PAIN, UNSPECIFIED WHETHER SCIATICA PRESENT: ICD-10-CM

## 2024-08-28 DIAGNOSIS — N52.9 ERECTILE DYSFUNCTION, UNSPECIFIED ERECTILE DYSFUNCTION TYPE: ICD-10-CM

## 2024-08-28 DIAGNOSIS — G89.29 CHRONIC LEFT-SIDED LOW BACK PAIN, UNSPECIFIED WHETHER SCIATICA PRESENT: ICD-10-CM

## 2024-08-28 DIAGNOSIS — G47.33 OBSTRUCTIVE SLEEP APNEA: ICD-10-CM

## 2024-08-28 PROCEDURE — 99214 OFFICE O/P EST MOD 30 MIN: CPT | Performed by: STUDENT IN AN ORGANIZED HEALTH CARE EDUCATION/TRAINING PROGRAM

## 2024-08-28 PROCEDURE — 1036F TOBACCO NON-USER: CPT | Performed by: STUDENT IN AN ORGANIZED HEALTH CARE EDUCATION/TRAINING PROGRAM

## 2024-08-28 PROCEDURE — G8427 DOCREV CUR MEDS BY ELIG CLIN: HCPCS | Performed by: STUDENT IN AN ORGANIZED HEALTH CARE EDUCATION/TRAINING PROGRAM

## 2024-08-28 PROCEDURE — 1123F ACP DISCUSS/DSCN MKR DOCD: CPT | Performed by: STUDENT IN AN ORGANIZED HEALTH CARE EDUCATION/TRAINING PROGRAM

## 2024-08-28 PROCEDURE — G8420 CALC BMI NORM PARAMETERS: HCPCS | Performed by: STUDENT IN AN ORGANIZED HEALTH CARE EDUCATION/TRAINING PROGRAM

## 2024-08-28 RX ORDER — ROSUVASTATIN CALCIUM 20 MG/1
10 TABLET, COATED ORAL NIGHTLY
Qty: 90 TABLET | Refills: 2 | Status: SHIPPED | OUTPATIENT
Start: 2024-08-28

## 2024-08-28 RX ORDER — TADALAFIL 20 MG/1
TABLET ORAL
Qty: 10 TABLET | Refills: 5 | Status: SHIPPED | OUTPATIENT
Start: 2024-08-28

## 2024-08-28 ASSESSMENT — ENCOUNTER SYMPTOMS
SHORTNESS OF BREATH: 0
ANAL BLEEDING: 0
ABDOMINAL PAIN: 0
BLOOD IN STOOL: 0
BACK PAIN: 1

## 2024-10-15 ENCOUNTER — TELEPHONE (OUTPATIENT)
Dept: INTERNAL MEDICINE CLINIC | Facility: CLINIC | Age: 74
End: 2024-10-15

## 2024-10-15 NOTE — TELEPHONE ENCOUNTER
Pt called to inform provider that he missed PT appt for 10/15. Pt would like to know what he needs to do now.

## 2024-10-17 ENCOUNTER — HOSPITAL ENCOUNTER (OUTPATIENT)
Dept: PHYSICAL THERAPY | Age: 74
Setting detail: RECURRING SERIES
Discharge: HOME | End: 2024-10-20
Attending: STUDENT IN AN ORGANIZED HEALTH CARE EDUCATION/TRAINING PROGRAM
Payer: MEDICARE

## 2024-10-17 DIAGNOSIS — M54.51 VERTEBROGENIC LOW BACK PAIN: Primary | ICD-10-CM

## 2024-10-17 DIAGNOSIS — M25.551 PAIN IN RIGHT HIP: ICD-10-CM

## 2024-10-17 DIAGNOSIS — R26.2 DIFFICULTY IN WALKING, NOT ELSEWHERE CLASSIFIED: ICD-10-CM

## 2024-10-17 PROCEDURE — 97162 PT EVAL MOD COMPLEX 30 MIN: CPT

## 2024-10-17 PROCEDURE — 97110 THERAPEUTIC EXERCISES: CPT

## 2024-10-17 NOTE — THERAPY EVALUATION
back/stiffness3\"  Initial Pain Level:      3 /10 now   ranges 3-6/10  Post Session Pain Level:   1   /10  Past Medical History/Comorbidities:   Mr. Sanderson  has a past medical history of CKD (chronic kidney disease), Diverticulosis, Essential tremor, Glucose intolerance (impaired glucose tolerance), History of stress test, Hyperlipemia, Onychomycosis, and Osteopenia.  Mr. Sanderson  has a past surgical history that includes Colonoscopy (02/2017); Hemorrhoid surgery (1975); orthopedic surgery (04/2012); and Cataract removal (Right, 02/2018).  Social History/Living Environment:   Patient lives with their spouse    Prior Level of Function/Work/Activity:   Prior Level of Function: Independent      Learning:   Does the patient/guardian have any barriers to learning?: No barriers  Will there be a co-learner?: No  What is the preferred language of the patient/guardian?: English  Is an  required?: No  How does the patient/guardian prefer to learn new concepts?: Listening; Reading; Demonstration; Pictures/Videos    Fall Risk Scale:   Purvis Total Score: 0         OBJECTIVE   Pain- Pt with low back pain that radiates into R hip at times.  Ranges from 3-6/10  Worst in the morning and with bending and lifting  Posture- slouch sitter, with forward head and rounded shoulder posture, decreased lumbar curve.  Pt states he sits in a recliner though he is not much of a \"sitter\"  ROM- trunk flexion- finger tips to ankle - pulling in LB                      Ext- 50% \"feels good\"                      Lateral glide- Hips R- R hip pain and stiffer than shifting L                                            Hips L- back pain  Strength- L hip flex 4+/5   R hip flex 4/5                                           R HS 4/5                                           R ant tib- 4/5  Mobility- sit to stand- stiff with \"catch\" as he rises  Repeated motions- ROCK- decrease in hip pain and gait feels \"easier\"              Outcome Measure:   Tool

## 2024-10-17 NOTE — PROGRESS NOTES
Issa Sanderson  : 1950  Primary: Medicare Part A And B (Medicare)  Secondary: [employer] MEDICARE SUPP [facility]  [address] [place] [address]  Phone: [phone]  Fax: [phone] Plan Frequency: 2/week x 12 weeks    Plan of Care/Certification Expiration Date: 25        Plan of Care/Certification Expiration Date:  Plan of Care/Certification Expiration Date: 25    Frequency/Duration:   Plan Frequency: 2/week x 12 weeks      Time In/Out:   Time In: 0900  Time Out: 945      PT Visit Info:    Total # of Visits to Date: 1      Visit Count:  1    OUTPATIENT PHYSICAL THERAPY:   Treatment Note 10/17/2024       Episode  (low back and hip pain)               Treatment Diagnosis:    Vertebrogenic low back pain  Pain in right hip  Difficulty in walking, not elsewhere classified  Medical/Referring Diagnosis:    Chronic left-sided low back pain, unspecified whether sciatica present  Unsteadiness on feet      Referring Physician:  Pascual Lares DO MD Orders:  PT Eval and Treat   Return MD Appt:  25   Date of Onset:  Onset Date: 24     Allergies:   Patient has no known allergies.  Restrictions/Precautions:   None      Interventions Planned (Treatment may consist of any combination of the following):  Current Treatment Recommendations: Strengthening; ROM; Home exercise program; Manual; Aquatics; Stair training    See Assessment Note    Subjective Comments:   See ygoi  Initial Pain Level::     3 /10  Post Session Pain Level:      1  /10  Medications Last Reviewed:  10/17/2024  Updated Objective Findings:  See Evaluation Note from today  Treatment   THERAPEUTIC EXERCISE: (15 minutes):    Exercises per grid below to improve mobility.  Required minimal visual and verbal cues to promote proper body alignment and promote proper body posture.  Progressed range and repetitions as indicated.   Date:  10/17/24 Date:   Date:

## 2024-10-18 ENCOUNTER — LAB (OUTPATIENT)
Dept: INTERNAL MEDICINE CLINIC | Facility: CLINIC | Age: 74
End: 2024-10-18

## 2024-10-18 DIAGNOSIS — Z12.5 PROSTATE CANCER SCREENING: ICD-10-CM

## 2024-10-18 DIAGNOSIS — E78.5 DYSLIPIDEMIA: ICD-10-CM

## 2024-10-18 LAB
CHOLEST SERPL-MCNC: 141 MG/DL (ref 0–200)
HDLC SERPL-MCNC: 56 MG/DL (ref 40–60)
HDLC SERPL: 2.5 (ref 0–5)
LDLC SERPL CALC-MCNC: 66 MG/DL (ref 0–100)
PSA SERPL-MCNC: 1.1 NG/ML (ref 0–4)
T4 FREE SERPL-MCNC: 1.3 NG/DL (ref 0.9–1.7)
TRIGL SERPL-MCNC: 96 MG/DL (ref 0–150)
TSH, 3RD GENERATION: 1.35 UIU/ML (ref 0.27–4.2)
VLDLC SERPL CALC-MCNC: 19 MG/DL (ref 6–23)

## 2024-10-23 ENCOUNTER — HOSPITAL ENCOUNTER (OUTPATIENT)
Dept: PHYSICAL THERAPY | Age: 74
Setting detail: RECURRING SERIES
Discharge: HOME | End: 2024-10-26
Attending: STUDENT IN AN ORGANIZED HEALTH CARE EDUCATION/TRAINING PROGRAM
Payer: MEDICARE

## 2024-10-23 PROCEDURE — 97110 THERAPEUTIC EXERCISES: CPT

## 2024-10-23 NOTE — PROGRESS NOTES
Issa Sanderson  : 1950  Primary: Medicare Part A And B (Medicare)  Secondary: [employer] MEDICARE SUPP [facility] [address] [place] [address]  Phone: [phone]  Fax: [phone] Plan Frequency: 2/week x 12 weeks    Plan of Care/Certification Expiration Date: 25        Plan of Care/Certification Expiration Date:  Plan of Care/Certification Expiration Date: 25    Frequency/Duration:   Plan Frequency: 2/week x 12 weeks      Time In/Out:          PT Visit Info:    Total # of Visits to Date: 1      Visit Count:  2    OUTPATIENT PHYSICAL THERAPY:   Treatment Note 10/23/2024       Episode  (low back and hip pain)               Treatment Diagnosis:    No data found  Medical/Referring Diagnosis:    Chronic left-sided low back pain, unspecified whether sciatica present  Unsteadiness on feet      Referring Physician:  Pascual Lares DO MD Orders:  PT Eval and Treat   Return MD Appt:  25   Date of Onset:  Onset Date: 24     Allergies:   Patient has no known allergies.  Restrictions/Precautions:   None      Interventions Planned (Treatment may consist of any combination of the following):  Current Treatment Recommendations: Strengthening; ROM; Home exercise program; Manual; Aquatics; Stair training    See Assessment Note    Subjective Comments: I think my hip is worse - I was on my feet all day on Sat and did not do the ex that day and it was worse. On other days did them 4 sets each day.    Initial Pain Level::     2 /10    Post Session Pain Level:      0  /10  Medications Last Reviewed:  10/23/2024  Updated Objective Findings:  None Today  Treatment   THERAPEUTIC EXERCISE: (45 minutes):    Exercises per grid below to improve mobility.  Required minimal visual and verbal cues to promote proper body alignment and promote proper body posture.  Progressed range and repetitions as indicated.   Date:  10/17/24 Date:  10/23/24

## 2024-10-25 ENCOUNTER — HOSPITAL ENCOUNTER (OUTPATIENT)
Dept: PHYSICAL THERAPY | Age: 74
Setting detail: RECURRING SERIES
Discharge: HOME | End: 2024-10-28
Attending: STUDENT IN AN ORGANIZED HEALTH CARE EDUCATION/TRAINING PROGRAM
Payer: MEDICARE

## 2024-10-25 PROCEDURE — 97110 THERAPEUTIC EXERCISES: CPT

## 2024-10-25 NOTE — PROGRESS NOTES
Date:  10/25/24   Activity/Exercise Parameters Parameters Parameters   education Sitting posture, sleeping posture     Standing lumbar ext 3 x 10 3 x 10 2 x 10   Prone lying  Prone propping  4 x 1 min  3 x 90 sec Prop using table 5 min  Prop on elbows 2 x 90 sec   Thoracic ext   Over chair back x 10 X 10   Thumbs to wall standing   X 10               HEP standing ext or prone ext ex every 2-3 hours continue    Treatment/Session Summary:    Treatment Assessment:   Improving.  Will begin with strength next week  Communication/Consultation:  None today  Equipment provided today:  HEP  Recommendations/Intent for next treatment session: Next visit will focus on lumbar spine, LE and mobilty.    >Total Treatment Billable Duration:  40 minutes        June Lerma PT         Charge Capture  Events  [employer] Portal  Appt Desk  Attendance Report     Future Appointments   Date Time Provider Department Center   10/25/2024  2:00 PM June Lerma, PT [facility]   10/28/2024 12:30 PM June Lerma, PT [facility]   10/30/2024 12:30 PM June Lerma, PT [facility]   11/21/2024  1:30 PM Roula Hsu, APRN - CNP [facility]   2/21/2025  1:15 PM Cristofer Gomez MD [facility]   2/28/2025  9:40 AM Pascual Lares DO [facility]

## 2024-10-28 ENCOUNTER — HOSPITAL ENCOUNTER (OUTPATIENT)
Dept: PHYSICAL THERAPY | Age: 74
Setting detail: RECURRING SERIES
Discharge: HOME | End: 2024-10-31
Attending: STUDENT IN AN ORGANIZED HEALTH CARE EDUCATION/TRAINING PROGRAM
Payer: MEDICARE

## 2024-10-28 PROCEDURE — 97110 THERAPEUTIC EXERCISES: CPT

## 2024-10-28 NOTE — PROGRESS NOTES
Issa Sanderson  : 1950  Primary: Medicare Part A And B (Medicare)  Secondary: [employer] MEDICARE SUPP [facility] [address] [place] [address]  Phone: [phone]  Fax: [phone] Plan Frequency: 2/week x 12 weeks    Plan of Care/Certification Expiration Date: 25        Plan of Care/Certification Expiration Date:  Plan of Care/Certification Expiration Date: 25    Frequency/Duration:   Plan Frequency: 2/week x 12 weeks      Time In/Out:   Time In: 1230  Time Out: 0115      PT Visit Info:    Total # of Visits to Date: 4      Visit Count:  4    OUTPATIENT PHYSICAL THERAPY:   Treatment Note 10/28/2024       Episode  (low back and hip pain)               Treatment Diagnosis:    No data found  Medical/Referring Diagnosis:    Chronic left-sided low back pain, unspecified whether sciatica present  Unsteadiness on feet      Referring Physician:  Pascual Lares DO MD Orders:  PT Eval and Treat   Return MD Appt:  25   Date of Onset:  Onset Date: 24     Allergies:   Patient has no known allergies.  Restrictions/Precautions:   None      Interventions Planned (Treatment may consist of any combination of the following):  Current Treatment Recommendations: Strengthening; ROM; Home exercise program; Manual; Aquatics; Stair training    See Assessment Note    Subjective Comments: doing pretty well.    Initial Pain Level::     2 /10  low back  Post Session Pain Level:      1  /10  Medications Last Reviewed:  10/28/2024  Updated Objective Findings:  None Today  Treatment   THERAPEUTIC EXERCISE: (40 minutes):    Exercises per grid below to improve mobility.  Required minimal visual and verbal cues to promote proper body alignment and promote proper body posture.  Progressed range and repetitions as indicated.   Date:  10/17/24 Date:  10/23/24 Date:  10/25/24 Date  10/28/24   Activity/Exercise Parameters Parameters Parameters

## 2024-10-30 ENCOUNTER — HOSPITAL ENCOUNTER (OUTPATIENT)
Dept: PHYSICAL THERAPY | Age: 74
Setting detail: RECURRING SERIES
Discharge: HOME | End: 2024-11-02
Attending: STUDENT IN AN ORGANIZED HEALTH CARE EDUCATION/TRAINING PROGRAM
Payer: MEDICARE

## 2024-10-30 PROCEDURE — 97110 THERAPEUTIC EXERCISES: CPT

## 2024-10-30 NOTE — PROGRESS NOTES
Issa Sanderson  : 1950  Primary: Medicare Part A And B (Medicare)  Secondary: [employer] MEDICARE SUPP [facility] [place] [address]  Phone: [phone]  Fax: [phone] Plan Frequency: 2/week x 12 weeks    Plan of Care/Certification Expiration Date: 25        Plan of Care/Certification Expiration Date:  Plan of Care/Certification Expiration Date: 25    Frequency/Duration:   Plan Frequency: 2/week x 12 weeks      Time In/Out:   Time In: 1230  Time Out: 0115      PT Visit Info:    Total # of Visits to Date: 5      Visit Count:  5    OUTPATIENT PHYSICAL THERAPY:   Treatment Note 10/30/2024       Episode  (low back and hip pain)               Treatment Diagnosis:    No data found  Medical/Referring Diagnosis:    Chronic left-sided low back pain, unspecified whether sciatica present  Unsteadiness on feet      Referring Physician:  Pascual Lares DO MD Orders:  PT Eval and Treat   Return MD Appt:  25   Date of Onset:  Onset Date: 24     Allergies:   Patient has no known allergies.  Restrictions/Precautions:   None      Interventions Planned (Treatment may consist of any combination of the following):  Current Treatment Recommendations: Strengthening; ROM; Home exercise program; Manual; Aquatics; Stair training    See Assessment Note    Subjective Comments: I can tell my legs are weaker than I thought with those ex we did last time. My hip still hurts at night when I lay on it for awhile.    Initial Pain Level::     2 10    Post Session Pain Level:        /10  Medications Last Reviewed:  10/30/2024  Updated Objective Findings:  None Today  Treatment   THERAPEUTIC EXERCISE: (45 minutes):    Exercises per grid below to improve mobility.  Required minimal visual and verbal cues to promote proper body alignment and promote proper body posture.  Progressed range and repetitions as indicated.   Date:  10/17/24

## 2024-11-04 ENCOUNTER — HOSPITAL ENCOUNTER (OUTPATIENT)
Dept: PHYSICAL THERAPY | Age: 74
Setting detail: RECURRING SERIES
Discharge: HOME | End: 2024-11-07
Attending: STUDENT IN AN ORGANIZED HEALTH CARE EDUCATION/TRAINING PROGRAM
Payer: MEDICARE

## 2024-11-04 PROCEDURE — 97110 THERAPEUTIC EXERCISES: CPT

## 2024-11-04 NOTE — PROGRESS NOTES
Issa Sanderson  : 1950  Primary: Medicare Part A And B (Medicare)  Secondary: [employer] MEDICARE SUPP [facility] [place] [address]  Phone: [phone]  Fax: [phone] Plan Frequency: 2/week x 12 weeks    Plan of Care/Certification Expiration Date: 25        Plan of Care/Certification Expiration Date:  Plan of Care/Certification Expiration Date: 25    Frequency/Duration:   Plan Frequency: 2/week x 12 weeks      Time In/Out:   Time In: 1230  Time Out: 0115      PT Visit Info:    Total # of Visits to Date: 6      Visit Count:  6    OUTPATIENT PHYSICAL THERAPY:   Treatment Note 2024       Episode  (low back and hip pain)               Treatment Diagnosis:    No data found  Medical/Referring Diagnosis:    Chronic left-sided low back pain, unspecified whether sciatica present  Unsteadiness on feet      Referring Physician:  Pascual Lares DO MD Orders:  PT Eval and Treat   Return MD Appt:  25   Date of Onset:  Onset Date: 24     Allergies:   Patient has no known allergies.  Restrictions/Precautions:   None      Interventions Planned (Treatment may consist of any combination of the following):  Current Treatment Recommendations: Strengthening; ROM; Home exercise program; Manual; Aquatics; Stair training    See Assessment Note    Subjective Comments: doing pretty well.  I have some hip pain if I sit too long    Initial Pain Level::     0 /10    Post Session Pain Level:      0  /10  Medications Last Reviewed:  2024  Updated Objective Findings:  None Today  Treatment   THERAPEUTIC EXERCISE: (45 minutes):    Exercises per grid below to improve mobility.  Required minimal visual and verbal cues to promote proper body alignment and promote proper body posture.  Progressed range and repetitions as indicated.   Date:  10/17/24 Date:  10/23/24 Date:  10/25/24 Date  10/28/24 Date  10/30/24 Date  24

## 2024-11-06 ENCOUNTER — HOSPITAL ENCOUNTER (OUTPATIENT)
Dept: PHYSICAL THERAPY | Age: 74
Setting detail: RECURRING SERIES
Discharge: HOME | End: 2024-11-09
Attending: STUDENT IN AN ORGANIZED HEALTH CARE EDUCATION/TRAINING PROGRAM
Payer: MEDICARE

## 2024-11-06 PROCEDURE — 97110 THERAPEUTIC EXERCISES: CPT

## 2024-11-13 DIAGNOSIS — E83.110 HEREDITARY HEMOCHROMATOSIS (HCC): ICD-10-CM

## 2024-11-13 LAB
ALBUMIN SERPL-MCNC: 3.7 G/DL (ref 3.2–4.6)
ALBUMIN/GLOB SERPL: 1.1 (ref 1–1.9)
ALP SERPL-CCNC: 64 U/L (ref 40–129)
ALT SERPL-CCNC: 13 U/L (ref 8–55)
ANION GAP SERPL CALC-SCNC: 11 MMOL/L (ref 7–16)
AST SERPL-CCNC: 20 U/L (ref 15–37)
BASOPHILS # BLD: 0 K/UL (ref 0–0.2)
BASOPHILS NFR BLD: 1 % (ref 0–2)
BILIRUB SERPL-MCNC: 0.4 MG/DL (ref 0–1.2)
BUN SERPL-MCNC: 14 MG/DL (ref 8–23)
CALCIUM SERPL-MCNC: 9.9 MG/DL (ref 8.8–10.2)
CHLORIDE SERPL-SCNC: 103 MMOL/L (ref 98–107)
CO2 SERPL-SCNC: 29 MMOL/L (ref 20–29)
CREAT SERPL-MCNC: 1.07 MG/DL (ref 0.8–1.3)
DIFFERENTIAL METHOD BLD: ABNORMAL
EOSINOPHIL # BLD: 0 K/UL (ref 0–0.8)
EOSINOPHIL NFR BLD: 1 % (ref 0.5–7.8)
ERYTHROCYTE [DISTWIDTH] IN BLOOD BY AUTOMATED COUNT: 12.6 % (ref 11.9–14.6)
FERRITIN SERPL-MCNC: 102 NG/ML (ref 8–388)
GLOBULIN SER CALC-MCNC: 3.3 G/DL (ref 2.3–3.5)
GLUCOSE SERPL-MCNC: 113 MG/DL (ref 70–99)
HCT VFR BLD AUTO: 49.3 % (ref 41.1–50.3)
HGB BLD-MCNC: 16.4 G/DL (ref 13.6–17.2)
IMM GRANULOCYTES # BLD AUTO: 0 K/UL (ref 0–0.5)
IMM GRANULOCYTES NFR BLD AUTO: 0 % (ref 0–5)
IRON SATN MFR SERPL: 28 % (ref 20–50)
IRON SERPL-MCNC: 76 UG/DL (ref 35–100)
LYMPHOCYTES # BLD: 3.4 K/UL (ref 0.5–4.6)
LYMPHOCYTES NFR BLD: 46 % (ref 13–44)
MCH RBC QN AUTO: 32.2 PG (ref 26.1–32.9)
MCHC RBC AUTO-ENTMCNC: 33.3 G/DL (ref 31.4–35)
MCV RBC AUTO: 96.9 FL (ref 82–102)
MONOCYTES # BLD: 0.6 K/UL (ref 0.1–1.3)
MONOCYTES NFR BLD: 9 % (ref 4–12)
NEUTS SEG # BLD: 3.1 K/UL (ref 1.7–8.2)
NEUTS SEG NFR BLD: 43 % (ref 43–78)
NRBC # BLD: 0 K/UL (ref 0–0.2)
PLATELET # BLD AUTO: 242 K/UL (ref 150–450)
PMV BLD AUTO: 11.1 FL (ref 9.4–12.3)
POTASSIUM SERPL-SCNC: 4 MMOL/L (ref 3.5–5.1)
PROT SERPL-MCNC: 7 G/DL (ref 6.3–8.2)
RBC # BLD AUTO: 5.09 M/UL (ref 4.23–5.6)
SODIUM SERPL-SCNC: 143 MMOL/L (ref 136–145)
TIBC SERPL-MCNC: 277 UG/DL (ref 240–450)
UIBC SERPL-MCNC: 201 UG/DL (ref 112–347)
WBC # BLD AUTO: 7.2 K/UL (ref 4.3–11.1)

## 2024-11-20 ENCOUNTER — HOSPITAL ENCOUNTER (OUTPATIENT)
Dept: PHYSICAL THERAPY | Age: 74
Setting detail: RECURRING SERIES
Discharge: HOME | End: 2024-11-23
Attending: STUDENT IN AN ORGANIZED HEALTH CARE EDUCATION/TRAINING PROGRAM
Payer: MEDICARE

## 2024-11-20 PROCEDURE — 97110 THERAPEUTIC EXERCISES: CPT

## 2024-11-20 NOTE — PROGRESS NOTES
Issa Snaderson  : 1950  Primary: Medicare Part A And B (Medicare)  Secondary: [employer] C[employer] MEDICARE SUPP [facility] [place] [address]  Phone: [phone]  Fax: [phone] Plan Frequency: 2/week x 12 weeks    Plan of Care/Certification Expiration Date: 25        Plan of Care/Certification Expiration Date:  Plan of Care/Certification Expiration Date: 25    Frequency/Duration:   Plan Frequency: 2/week x 12 weeks      Time In/Out:          PT Visit Info:    Total # of Visits to Date: 7      Visit Count:  8    OUTPATIENT PHYSICAL THERAPY:   Treatment Note 2024       Episode  (low back and hip pain)               Treatment Diagnosis:    No data found  Medical/Referring Diagnosis:    Chronic left-sided low back pain, unspecified whether sciatica present  Unsteadiness on feet      Referring Physician:  Pascual Lares DO MD Orders:  PT Eval and Treat   Return MD Appt:  25   Date of Onset:  Onset Date: 24     Allergies:   Patient has no known allergies.  Restrictions/Precautions:   None      Interventions Planned (Treatment may consist of any combination of the following):  Current Treatment Recommendations: Strengthening; ROM; Home exercise program; Manual; Aquatics; Stair training    See Assessment Note    Subjective Comments: doing pretty well.  I still have hip pain when I stand up from sitting for a while    Initial Pain Level::     0 /10    Post Session Pain Level:      0  /10  Medications Last Reviewed:  2024  Updated Objective Findings:  None Today  Treatment   THERAPEUTIC EXERCISE: (45 minutes):    Exercises per grid below to improve mobility.  Required minimal visual and verbal cues to promote proper body alignment and promote proper body posture.  Progressed range and repetitions as indicated.   Date:  10/17/24 Date:  10/23/24 Date:  10/25/24 Date  10/28/24 Date  10/30/24 Date  24

## 2024-11-21 ENCOUNTER — OFFICE VISIT (OUTPATIENT)
Dept: ONCOLOGY | Age: 74
End: 2024-11-21
Payer: MEDICARE

## 2024-11-21 VITALS
DIASTOLIC BLOOD PRESSURE: 68 MMHG | OXYGEN SATURATION: 98 % | RESPIRATION RATE: 16 BRPM | TEMPERATURE: 97.9 F | WEIGHT: 167.3 LBS | HEART RATE: 66 BPM | HEIGHT: 69 IN | SYSTOLIC BLOOD PRESSURE: 122 MMHG | BODY MASS INDEX: 24.78 KG/M2

## 2024-11-21 DIAGNOSIS — E83.110 HEREDITARY HEMOCHROMATOSIS (HCC): Primary | ICD-10-CM

## 2024-11-21 PROCEDURE — 1036F TOBACCO NON-USER: CPT | Performed by: NURSE PRACTITIONER

## 2024-11-21 PROCEDURE — 99214 OFFICE O/P EST MOD 30 MIN: CPT | Performed by: NURSE PRACTITIONER

## 2024-11-21 PROCEDURE — 1159F MED LIST DOCD IN RCRD: CPT | Performed by: NURSE PRACTITIONER

## 2024-11-21 PROCEDURE — 1160F RVW MEDS BY RX/DR IN RCRD: CPT | Performed by: NURSE PRACTITIONER

## 2024-11-21 PROCEDURE — G8484 FLU IMMUNIZE NO ADMIN: HCPCS | Performed by: NURSE PRACTITIONER

## 2024-11-21 PROCEDURE — 1126F AMNT PAIN NOTED NONE PRSNT: CPT | Performed by: NURSE PRACTITIONER

## 2024-11-21 PROCEDURE — 1123F ACP DISCUSS/DSCN MKR DOCD: CPT | Performed by: NURSE PRACTITIONER

## 2024-11-21 PROCEDURE — G8420 CALC BMI NORM PARAMETERS: HCPCS | Performed by: NURSE PRACTITIONER

## 2024-11-21 PROCEDURE — G8427 DOCREV CUR MEDS BY ELIG CLIN: HCPCS | Performed by: NURSE PRACTITIONER

## 2024-11-21 ASSESSMENT — PATIENT HEALTH QUESTIONNAIRE - PHQ9
SUM OF ALL RESPONSES TO PHQ QUESTIONS 1-9: 0
2. FEELING DOWN, DEPRESSED OR HOPELESS: NOT AT ALL
SUM OF ALL RESPONSES TO PHQ QUESTIONS 1-9: 0
SUM OF ALL RESPONSES TO PHQ QUESTIONS 1-9: 0
1. LITTLE INTEREST OR PLEASURE IN DOING THINGS: NOT AT ALL
SUM OF ALL RESPONSES TO PHQ9 QUESTIONS 1 & 2: 0
SUM OF ALL RESPONSES TO PHQ QUESTIONS 1-9: 0

## 2024-11-21 NOTE — PROGRESS NOTES
[facility]: Office Visit Established Patient    Chief Complaint:    Chief Complaint   Patient presents with    Follow-up         History of Present Illness:  Mr. Sanderson is a 74 y.o. male who presents today for follow-up regarding hereditary hemochromatosis.  He had labs done at the sleep center because of RLS, ferritin was 440 on routine labs.  Repeat labs in September 2023 showed a TSAT 46% and ferritin of 334.  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  It is encouraging that he is 73 and has never had any iron overload symptoms, but I would still favor phlebotomy to decrease his risk of end-organ damage.  This can be done weekly for 4 weeks, then recheck CBC and ferritin with a goal of  (and minimal anemia).      He returns today for routine follow-up for HH.  Since last seen he has been well.  His last phlebotomy was on 10/12, roughly 4 months from the last.  Appetite is good and he notes intermittent early satiety that is unchanged.  There are no GI or bowel complaints.  His energy level is fair/stable.  Currently, he is in PT for general strength training.  He denies any respiratory symptoms or edema.  No fevers or infectious symptoms.      Review of Systems:  Constitutional: Negative.   HENT: Negative.   Eyes: Negative.   Respiratory: Negative.   Cardiovascular: Negative.   Gastrointestinal: Negative.   Genitourinary: Negative.   Musculoskeletal: Negative.   Skin: Negative.   Neurological: Negative.   Endo/Heme/Allergies: Negative.   Psychiatric/Behavioral: Negative.   All other systems reviewed and are negative.     No Known Allergies  Past Medical History:   Diagnosis Date    CKD (chronic kidney disease)     Diverticulosis     Essential tremor     Glucose intolerance (impaired glucose tolerance)     History of stress test >25 years ago    WNL    Hyperlipemia     Onychomycosis     Osteopenia      Past Surgical

## 2024-11-22 ENCOUNTER — HOSPITAL ENCOUNTER (OUTPATIENT)
Dept: PHYSICAL THERAPY | Age: 74
Setting detail: RECURRING SERIES
Discharge: HOME | End: 2024-11-25
Attending: STUDENT IN AN ORGANIZED HEALTH CARE EDUCATION/TRAINING PROGRAM
Payer: MEDICARE

## 2024-11-22 PROCEDURE — 97110 THERAPEUTIC EXERCISES: CPT

## 2024-11-22 NOTE — PROGRESS NOTES
Issa Sanderson  : 1950  Primary: Medicare Part A And B (Medicare)  Secondary: [employer] HE[employer] C[employer] MEDICARE SUPP [facility] [place] [address]  Phone: [phone]  Fax: [phone] Plan Frequency: 2/week x 12 weeks    Plan of Care/Certification Expiration Date: 25        Plan of Care/Certification Expiration Date:  Plan of Care/Certification Expiration Date: 25    Frequency/Duration:   Plan Frequency: 2/week x 12 weeks      Time In/Out:   Time In: 1115  Time Out: 1200      PT Visit Info:    Total # of Visits to Date: 8      Visit Count:  9    OUTPATIENT PHYSICAL THERAPY:   Treatment Note 2024       Episode  (low back and hip pain)               Treatment Diagnosis:    No data found  Medical/Referring Diagnosis:    Chronic left-sided low back pain, unspecified whether sciatica present  Unsteadiness on feet      Referring Physician:  Pascual Lares DO MD Orders:  PT Eval and Treat   Return MD Appt:  25   Date of Onset:  Onset Date: 24     Allergies:   Patient has no known allergies.  Restrictions/Precautions:   None      Interventions Planned (Treatment may consist of any combination of the following):  Current Treatment Recommendations: Strengthening; ROM; Home exercise program; Manual; Aquatics; Stair training    See Assessment Note    Subjective Comments: doing ok.  I have pain at night when I lay down and in the morning.      Initial Pain Level::     0 /10    Post Session Pain Level:      0  /10  Medications Last Reviewed:  2024  Updated Objective Findings:  None Today  Treatment   THERAPEUTIC EXERCISE: (45 minutes):    Exercises per grid below to improve mobility.  Required minimal visual and verbal cues to promote proper body alignment and promote proper body posture.  Progressed range and repetitions as indicated.   Date:  10/17/24 Date:  10/23/24 Date:  10/25/24 Date  10/28/24 Date  10/30/24

## 2024-11-26 ENCOUNTER — HOSPITAL ENCOUNTER (OUTPATIENT)
Dept: PHYSICAL THERAPY | Age: 74
Setting detail: RECURRING SERIES
Discharge: HOME | End: 2024-11-29
Attending: STUDENT IN AN ORGANIZED HEALTH CARE EDUCATION/TRAINING PROGRAM
Payer: MEDICARE

## 2024-11-26 PROCEDURE — 97110 THERAPEUTIC EXERCISES: CPT

## 2024-11-26 NOTE — PROGRESS NOTES
Issa Sanderson  : 1950  Primary: Medicare Part A And B (Medicare)  Secondary: [employer] HE[employer] C[employer] MEDICARE SUPP [facility] [place] [address]  Phone: [phone]  Fax: [phone] Plan Frequency: 2/week x 12 weeks    Plan of Care/Certification Expiration Date: 25        Plan of Care/Certification Expiration Date:  Plan of Care/Certification Expiration Date: 25    Frequency/Duration:   Plan Frequency: 2/week x 12 weeks      Time In/Out:   Time In: 1115  Time Out: 1200      PT Visit Info:    Total # of Visits to Date: 10      Visit Count:  10    OUTPATIENT PHYSICAL THERAPY:   Treatment Note 2024       Episode  (low back and hip pain)               Treatment Diagnosis:    No data found  Medical/Referring Diagnosis:    Chronic left-sided low back pain, unspecified whether sciatica present  Unsteadiness on feet      Referring Physician:  Pascual Lares DO MD Orders:  PT Eval and Treat   Return MD Appt:  25   Date of Onset:  Onset Date: 24     Allergies:   Patient has no known allergies.  Restrictions/Precautions:   None      Interventions Planned (Treatment may consist of any combination of the following):  Current Treatment Recommendations: Strengthening; ROM; Home exercise program; Manual; Aquatics; Stair training    See Assessment Note    Subjective Comments: I am doing better.  Pain runs about 0-4/10    Initial Pain Level::     2 /10    Post Session Pain Level:      0  /10  Medications Last Reviewed:  2024  Updated Objective Findings:  None Today  Treatment   THERAPEUTIC EXERCISE: (45 minutes):    Exercises per grid below to improve mobility.  Required minimal visual and verbal cues to promote proper body alignment and promote proper body posture.  Progressed range and repetitions as indicated.   Date  24 Date  24 Date  10/26/24   Activity/Exercise      education      Standing lumbar ext

## 2024-11-26 NOTE — PROGRESS NOTES
Issa Sanderson  : 1950  Primary: Medicare Part A And B (Medicare)  Secondary: [employer] HE[employer] C[employer] MEDICARE SUPP [facility] [place]  [address] [place]  [place] [address]  Phone: [phone]  Fax: [phone] Plan Frequency: 2/week x 12 weeks  Plan of Care/Certification Expiration Date: 25        Plan of Care/Certification Expiration Date:  Plan of Care/Certification Expiration Date: 25    Frequency/Duration: Plan Frequency: 2/week x 12 weeks      Time In/Out:   Time In: 1115  Time Out: 1200      PT Visit Info:    Total # of Visits to Date: 10      Visit Count:  10                OUTPATIENT PHYSICAL THERAPY:             Progress Report 2024               Episode (low back and hip pain)         Treatment Diagnosis:     No data found  Medical/Referring Diagnosis:    Chronic left-sided low back pain, unspecified whether sciatica present  Unsteadiness on feet      Referring Physician:  Pascual Lares DO MD Orders:  PT Eval and Treat   Return MD Appt:  25  Date of Onset:  Onset Date: 24  Allergies:  Patient has no known allergies.  Restrictions/Precautions:    None      Medications Last Reviewed:  2024     SUBJECTIVE   History of Injury/Illness (Reason for Referral):  Pt began having pain in April after doing some yard work and working on a car.  He had several days of not being able to straighten up.  Pain has improved but he is still having back pain.  The pain also travels down into the R hip at times.  Pain is worse with lifting and working in the yard and is uncomfortable with sitting for long periods.  Pain is worse in the morning.    Pt also is concerned that he cannot hold his head up straight and reports more difficulty when turning to see while driving.   Current situation-  24  Pt has attended 20 visits of PT. He is making improvements and is engaged in his therapy and HEP. He continues to have deficits in

## 2024-12-02 ENCOUNTER — HOSPITAL ENCOUNTER (OUTPATIENT)
Dept: PHYSICAL THERAPY | Age: 74
Setting detail: RECURRING SERIES
Discharge: HOME | End: 2024-12-05
Attending: STUDENT IN AN ORGANIZED HEALTH CARE EDUCATION/TRAINING PROGRAM
Payer: MEDICARE

## 2024-12-02 PROCEDURE — 97110 THERAPEUTIC EXERCISES: CPT

## 2024-12-02 NOTE — PROGRESS NOTES
Issa Sanderson  : 1950  Primary: Medicare Part A And B (Medicare)  Secondary: [employer] HE[employer] C[employer] MEDICARE SUPP [facility] [place] [address]  Phone: [phone]  Fax: [phone] Plan Frequency: 2/week x 12 weeks    Plan of Care/Certification Expiration Date: 25        Plan of Care/Certification Expiration Date:  Plan of Care/Certification Expiration Date: 25    Frequency/Duration:   Plan Frequency: 2/week x 12 weeks      Time In/Out:   Time In: 1115  Time Out: 1200      PT Visit Info:    Total # of Visits to Date: 11      Visit Count:  11    OUTPATIENT PHYSICAL THERAPY:   Treatment Note 2024       Episode  (low back and hip pain)               Treatment Diagnosis:    No data found  Medical/Referring Diagnosis:    Chronic left-sided low back pain, unspecified whether sciatica present  Unsteadiness on feet      Referring Physician:  Pascual Lares DO MD Orders:  PT Eval and Treat   Return MD Appt:  25   Date of Onset:  Onset Date: 24     Allergies:   Patient has no known allergies.  Restrictions/Precautions:   None      Interventions Planned (Treatment may consist of any combination of the following):  Current Treatment Recommendations: Strengthening; ROM; Home exercise program; Manual; Aquatics; Stair training    See Assessment Note    Subjective Comments:  I can tell I can reach up easier and my hip is hurting less often    Initial Pain Level::     2 /10    Post Session Pain Level:      0  /10  Medications Last Reviewed:  2024  Updated Objective Findings:  None Today  Treatment   THERAPEUTIC EXERCISE: (45 minutes):    Exercises per grid below to improve mobility.  Required minimal visual and verbal cues to promote proper body alignment and promote proper body posture.  Progressed range and repetitions as indicated.   Date  24 Date  24 Date  10/26/24 Date  24   Activity/Exercise

## 2024-12-06 ENCOUNTER — HOSPITAL ENCOUNTER (OUTPATIENT)
Dept: PHYSICAL THERAPY | Age: 74
Setting detail: RECURRING SERIES
Discharge: HOME | End: 2024-12-09
Attending: STUDENT IN AN ORGANIZED HEALTH CARE EDUCATION/TRAINING PROGRAM
Payer: MEDICARE

## 2024-12-06 PROCEDURE — 97110 THERAPEUTIC EXERCISES: CPT

## 2024-12-06 NOTE — PROGRESS NOTES
Issa Sanderson  : 1950  Primary: Medicare Part A And B (Medicare)  Secondary: [employer] HE[employer] C[employer] MEDICARE SUPP [facility] [place] [address]  Phone: [phone]  Fax: [phone] Plan Frequency: 2/week x 12 weeks    Plan of Care/Certification Expiration Date: 25        Plan of Care/Certification Expiration Date:  Plan of Care/Certification Expiration Date: 25    Frequency/Duration:   Plan Frequency: 2/week x 12 weeks      Time In/Out:   Time In: 1030  Time Out: 1115      PT Visit Info:    Total # of Visits to Date: 12      Visit Count:  12    OUTPATIENT PHYSICAL THERAPY:   Treatment Note 2024       Episode  (low back and hip pain)               Treatment Diagnosis:    No data found  Medical/Referring Diagnosis:    Chronic left-sided low back pain, unspecified whether sciatica present  Unsteadiness on feet      Referring Physician:  Pascual Lares DO MD Orders:  PT Eval and Treat   Return MD Appt:  25   Date of Onset:  Onset Date: 24     Allergies:   Patient has no known allergies.  Restrictions/Precautions:   None      Interventions Planned (Treatment may consist of any combination of the following):  Current Treatment Recommendations: Strengthening; ROM; Home exercise program; Manual; Aquatics; Stair training    See Assessment Note    Subjective Comments:  My R hip hurts if I lay on it and still gets stiff if I sit a lot    Initial Pain Level::     2 /10    Post Session Pain Level:      0  /10  Medications Last Reviewed:  2024  Updated Objective Findings:  None Today  Treatment   THERAPEUTIC EXERCISE: (45 minutes):    Exercises per grid below to improve mobility.  Required minimal visual and verbal cues to promote proper body alignment and promote proper body posture.  Progressed range and repetitions as indicated.   Date  24 Date  24 Date  10/26/24 Date  24 Date  24

## 2024-12-11 ENCOUNTER — HOSPITAL ENCOUNTER (OUTPATIENT)
Dept: PHYSICAL THERAPY | Age: 74
Setting detail: RECURRING SERIES
Discharge: HOME | End: 2024-12-14
Attending: STUDENT IN AN ORGANIZED HEALTH CARE EDUCATION/TRAINING PROGRAM
Payer: MEDICARE

## 2024-12-11 PROCEDURE — 97110 THERAPEUTIC EXERCISES: CPT

## 2024-12-11 NOTE — PROGRESS NOTES
Issa Sanderson  : 1950  Primary: Medicare Part A And B (Medicare)  Secondary: [employer]ARP HE[employer] C[employer] MEDICARE SUPP [facility] [place] [address]  Phone: [phone]  Fax: [phone] Plan Frequency: 2/week x 12 weeks    Plan of Care/Certification Expiration Date: 25        Plan of Care/Certification Expiration Date:  Plan of Care/Certification Expiration Date: 25    Frequency/Duration:   Plan Frequency: 2/week x 12 weeks      Time In/Out:   Time In: 1030  Time Out: 1115      PT Visit Info:    Total # of Visits to Date: 13      Visit Count:  13    OUTPATIENT PHYSICAL THERAPY:   Treatment Note 2024       Episode  (low back and hip pain)               Treatment Diagnosis:    No data found  Medical/Referring Diagnosis:    Chronic left-sided low back pain, unspecified whether sciatica present  Unsteadiness on feet      Referring Physician:  Pascual Lares DO MD Orders:  PT Eval and Treat   Return MD Appt:  25   Date of Onset:  Onset Date: 24     Allergies:   Patient has no known allergies.  Restrictions/Precautions:   None      Interventions Planned (Treatment may consist of any combination of the following):  Current Treatment Recommendations: Strengthening; ROM; Home exercise program; Manual; Aquatics; Stair training    See Assessment Note    Subjective Comments:  My R hip is only stiff if I sit a lot.  My neck and upper posture is the biggest issue    Initial Pain Level::     0 /10    Post Session Pain Level:      0  /10  Medications Last Reviewed:  2024  Updated Objective Findings:  None Today  Treatment   THERAPEUTIC EXERCISE: (45 minutes):    Exercises per grid below to improve mobility.  Required minimal visual and verbal cues to promote proper body alignment and promote proper body posture.  Progressed range and repetitions as indicated.   Date  24 Date  24 Date  10/26/24 Date  24

## 2024-12-13 ENCOUNTER — HOSPITAL ENCOUNTER (OUTPATIENT)
Dept: PHYSICAL THERAPY | Age: 74
Setting detail: RECURRING SERIES
Discharge: HOME | End: 2024-12-16
Attending: STUDENT IN AN ORGANIZED HEALTH CARE EDUCATION/TRAINING PROGRAM
Payer: MEDICARE

## 2024-12-13 PROCEDURE — 97110 THERAPEUTIC EXERCISES: CPT

## 2024-12-18 ENCOUNTER — HOSPITAL ENCOUNTER (OUTPATIENT)
Dept: PHYSICAL THERAPY | Age: 74
Setting detail: RECURRING SERIES
Discharge: HOME | End: 2024-12-21
Attending: STUDENT IN AN ORGANIZED HEALTH CARE EDUCATION/TRAINING PROGRAM
Payer: MEDICARE

## 2024-12-18 PROCEDURE — 97110 THERAPEUTIC EXERCISES: CPT

## 2024-12-18 NOTE — PROGRESS NOTES
Issa Sanderson  : 1950  Primary: Medicare Part A And B (Medicare)  Secondary: [employer] HE[employer] C[employer] MEDICARE SUPP [facility] [place] [address]  Phone: [phone]  Fax: [phone] Plan Frequency: 2/week x 12 weeks    Plan of Care/Certification Expiration Date: 25        Plan of Care/Certification Expiration Date:  Plan of Care/Certification Expiration Date: 25    Frequency/Duration:   Plan Frequency: 2/week x 12 weeks      Time In/Out:   Time In: 1230  Time Out: 0115      PT Visit Info:    Total # of Visits to Date: 15      Visit Count:  15    OUTPATIENT PHYSICAL THERAPY:   Treatment Note 2024       Episode  (low back and hip pain)               Treatment Diagnosis:    No data found  Medical/Referring Diagnosis:    Chronic left-sided low back pain, unspecified whether sciatica present  Unsteadiness on feet      Referring Physician:  Pascual Lares DO MD Orders:  PT Eval and Treat   Return MD Appt:  25   Date of Onset:  Onset Date: 24     Allergies:   Patient has no known allergies.  Restrictions/Precautions:   None      Interventions Planned (Treatment may consist of any combination of the following):  Current Treatment Recommendations: Strengthening; ROM; Home exercise program; Manual; Aquatics; Stair training    See Assessment Note    Subjective Comments:  I was doing some work under a car and had some neck pain and pain behind my shoulders but it passed pretty quickly.  Not something I normally do.    Initial Pain Level::     0 /10    Post Session Pain Level:      0  /10  Medications Last Reviewed:  2024  Updated Objective Findings:  None Today  Treatment   THERAPEUTIC EXERCISE: (45 minutes):    Exercises per grid below to improve mobility.  Required minimal visual and verbal cues to promote proper body alignment and promote proper body posture.  Progressed range and repetitions as indicated.

## 2024-12-20 ENCOUNTER — HOSPITAL ENCOUNTER (OUTPATIENT)
Dept: PHYSICAL THERAPY | Age: 74
Setting detail: RECURRING SERIES
Discharge: HOME | End: 2024-12-23
Attending: STUDENT IN AN ORGANIZED HEALTH CARE EDUCATION/TRAINING PROGRAM
Payer: MEDICARE

## 2024-12-20 PROCEDURE — 97110 THERAPEUTIC EXERCISES: CPT

## 2024-12-20 NOTE — PROGRESS NOTES
Date  11/20/24 Date  11/22/24 Date  10/26/24 Date  12/2/24 Date  12/6/24 Date  12/11/24 Date  12/13/24 Date  12/18/24 Date  12/20/24   Activity/Exercise            education            Standing lumbar ext            Prone lying  Prone propping 2 x 90 sec 2 x 2 min 3 min propping  2-3 min 2-3 min      Thoracic ext  X 10  X 10 X 10  Over back of chair 2 x 10 X 10 X 10 X 10   Thumbs to wall standing   X 12  15   X 10 Supine 2 x 10   Neck retraction sitting  Against wall x 10 Standing x 10 Standing x 10  10x   8x with therapist overpressure  Retract with ext x 8 10x  Retract with ext x 10 10x  Retract with ext x 5 10x retract with ext x 10   Nu step 5 min 5 min 1.5 level 5 min 1.5 level 5 min 1.5 level  5 min level 1.5 Nu step 1.5 level 5 min 1.5 level 5 min level 2.5   Sit to stand   On foam x 12 On foam 2 x 10     On foam 2 x 10   Standing arm and leg lift 10 x each side Quadruped 10x arm extension  10x leg extension Quadruped 10x arm  10x leg         Step ups            Hs curl   Prone 2 x 10         Heel raises            march            plank            qudruped  UE lift x 10   LE lift x 10     Prone prop reach with UE 2 x 5 each side     ER stretch Doorway 3 x 10 sec Doorway 3 x 20 each side  Against wall AA x 10        Rows Bent row 2 x 10 10#           Wall slides with lift 2 x 10 X 10          Open book  10x each side standing    10x e ach side  10x each side  10x    Active ER shoulder  At wall AA x 10  Yellow band 2 x 10  Yellow tubing x 15      Bent row    8# 1 x 15 each side        Mid trap   Lower trap    Standing bend 1 x 10 each on each side with cueing    Prone- breast stroke x 5  Opposite extrem lift in prone x 10    bridging     Hip abd red band 2 x 12        Hip abd     Hooklying red band 2 x 10 Standing blue band 2 x 10      Scap abd wall     [place] 7 x 5 sec  Yellow band x 5  Clock reach x 5     march     Standing red loop 2 x 10       Thoracic rotation       Sitting with hand to back of head x 5

## 2025-01-17 ENCOUNTER — HOSPITAL ENCOUNTER (OUTPATIENT)
Dept: PHYSICAL THERAPY | Age: 75
Setting detail: RECURRING SERIES
Discharge: HOME | End: 2025-01-20
Attending: STUDENT IN AN ORGANIZED HEALTH CARE EDUCATION/TRAINING PROGRAM
Payer: MEDICARE

## 2025-01-17 PROCEDURE — 97110 THERAPEUTIC EXERCISES: CPT

## 2025-01-17 NOTE — THERAPY RECERTIFICATION
Issa Sanderson  : 1950  Primary: Medicare Part A And B (Medicare)  Secondary: [employer] HE[employer] C[employer] MEDICARE SUPP [facility] [address] PED[address]  [place] [address]  Phone: [phone]  Fax: [phone] Plan Frequency: 2/week x 12 weeks  Plan of Care/Certification Expiration Date: 25        Plan of Care/Certification Expiration Date:  Plan of Care/Certification Expiration Date: 25    Frequency/Duration: Plan Frequency: 2/week x 12 weeks      Time In/Out:   Time In: 1030  Time Out: 1115      PT Visit Info:    Total # of Visits to Date: 17      Visit Count:  17                OUTPATIENT PHYSICAL THERAPY:             Recertification 2025               Episode (low back and hip pain)         Treatment Diagnosis:     No data found  Medical/Referring Diagnosis:    Chronic left-sided low back pain, unspecified whether sciatica present  Unsteadiness on feet      Referring Physician:  Pascual Lares DO MD Orders:  PT Eval and Treat   Return MD Appt:  25  Date of Onset:  Onset Date: 24  Allergies:  Patient has no known allergies.  Restrictions/Precautions:    None      Medications Last Reviewed:  2025     SUBJECTIVE   History of Injury/Illness (Reason for Referral):  Pt began having pain in April after doing some yard work and working on a car.  He had several days of not being able to straighten up.  Pain has improved but he is still having back pain.  The pain also travels down into the R hip at times.  Pain is worse with lifting and working in the yard and is uncomfortable with sitting for long periods.  Pain is worse in the morning.    Pt also is concerned that he cannot hold his head up straight and reports more difficulty when turning to see while driving.   Current situation-  24  Pt has attended 20 visits of PT. He is making improvements and is engaged in his therapy and HEP. He continues to have deficits in

## 2025-01-17 NOTE — PROGRESS NOTES
Issa Sanderson  : 1950  Primary: Medicare Part A And B (Medicare)  Secondary: [employer] HE[employer] C[employer] MEDICARE SUPP [facility] [place] [address]  Phone: [phone]  Fax: [phone] Plan Frequency: 2/week x 12 weeks    Plan of Care/Certification Expiration Date: 25        Plan of Care/Certification Expiration Date:  Plan of Care/Certification Expiration Date: 25    Frequency/Duration:   Plan Frequency: 2/week x 12 weeks      Time In/Out:   Time In: 1030  Time Out: 1115      PT Visit Info:    Total # of Visits to Date: 17      Visit Count:  17    OUTPATIENT PHYSICAL THERAPY:   Treatment Note 2025       Episode  (low back and hip pain)               Treatment Diagnosis:    No data found  Medical/Referring Diagnosis:    Chronic left-sided low back pain, unspecified whether sciatica present  Unsteadiness on feet      Referring Physician:  Pascual Lares DO MD Orders:  PT Eval and Treat   Return MD Appt:  25   Date of Onset:  Onset Date: 24     Allergies:   Patient has no known allergies.  Restrictions/Precautions:   None      Interventions Planned (Treatment may consist of any combination of the following):  Current Treatment Recommendations: Strengthening; ROM; Home exercise program; Manual; Aquatics; Stair training    See Assessment Note    Subjective Comments:  see recert    Initial Pain Level::     0 /10    Post Session Pain Level:      0  /10  Medications Last Reviewed:  2025  Updated Objective Findings:  None Today  Treatment   THERAPEUTIC EXERCISE: (45 minutes):  including re eval  Exercises per grid below to improve mobility.  Required minimal visual and verbal cues to promote proper body alignment and promote proper body posture.  Progressed range and repetitions as indicated.   Date  24 Date  24 Date  25   Activity/Exercise      education      Standing lumbar ext   2 x 10   Side

## 2025-01-20 ENCOUNTER — HOSPITAL ENCOUNTER (OUTPATIENT)
Dept: PHYSICAL THERAPY | Age: 75
Setting detail: RECURRING SERIES
Discharge: HOME | End: 2025-01-23
Attending: STUDENT IN AN ORGANIZED HEALTH CARE EDUCATION/TRAINING PROGRAM
Payer: MEDICARE

## 2025-01-20 PROCEDURE — 97110 THERAPEUTIC EXERCISES: CPT

## 2025-01-20 NOTE — PROGRESS NOTES
Issa Sanderson  : 1950  Primary: Medicare Part A And B (Medicare)  Secondary: [employer]ARP HE[employer] C[employer] MEDICARE SUPP [facility] [place] [address]  Phone: [phone]  Fax: [phone] Plan Frequency: 2/week x 12 weeks    Plan of Care/Certification Expiration Date: 25        Plan of Care/Certification Expiration Date:  Plan of Care/Certification Expiration Date: 25    Frequency/Duration:   Plan Frequency: 2/week x 12 weeks      Time In/Out:   Time In: 1030  Time Out: 1115      PT Visit Info:    Total # of Visits to Date: 18      Visit Count:  18    OUTPATIENT PHYSICAL THERAPY:   Treatment Note 2025       Episode  (low back and hip pain)               Treatment Diagnosis:    No data found  Medical/Referring Diagnosis:    Chronic left-sided low back pain, unspecified whether sciatica present  Unsteadiness on feet      Referring Physician:  Pascual Lares DO MD Orders:  PT Eval and Treat   Return MD Appt:  25   Date of Onset:  Onset Date: 24     Allergies:   Patient has no known allergies.  Restrictions/Precautions:   None      Interventions Planned (Treatment may consist of any combination of the following):  Current Treatment Recommendations: Strengthening; ROM; Home exercise program; Manual; Aquatics; Stair training    See Assessment Note    Subjective Comments:  see recert    Initial Pain Level::     0 /10    Post Session Pain Level:      0  /10  Medications Last Reviewed:  2025  Updated Objective Findings:  None Today  Treatment   THERAPEUTIC EXERCISE: (45 minutes):  including re eval  Exercises per grid below to improve mobility.  Required minimal visual and verbal cues to promote proper body alignment and promote proper body posture.  Progressed range and repetitions as indicated.   Date  24 Date  24 Date  25 Kristian  25   Activity/Exercise       education       Standing lumbar ext   2

## 2025-01-22 ENCOUNTER — HOSPITAL ENCOUNTER (OUTPATIENT)
Dept: PHYSICAL THERAPY | Age: 75
Setting detail: RECURRING SERIES
Discharge: HOME | End: 2025-01-25
Attending: STUDENT IN AN ORGANIZED HEALTH CARE EDUCATION/TRAINING PROGRAM
Payer: MEDICARE

## 2025-01-22 PROCEDURE — 97110 THERAPEUTIC EXERCISES: CPT

## 2025-01-22 NOTE — PROGRESS NOTES
Attendance Report     Future Appointments   Date Time Provider Department Center   1/27/2025 10:30 AM June Lerma, PT [facility]   1/29/2025 10:30 AM June Lerma, PT [facility]   2/3/2025 11:15 AM June Lerma, PT [facility]   2/5/2025 10:30 AM June Lerma, PT [facility]   2/21/2025  1:15 PM Cristofer Gomez MD [facility]   2/28/2025  9:40 AM Pascual Lares DO [facility] DE[facility]

## 2025-01-27 ENCOUNTER — HOSPITAL ENCOUNTER (OUTPATIENT)
Dept: PHYSICAL THERAPY | Age: 75
Setting detail: RECURRING SERIES
Discharge: HOME | End: 2025-01-30
Attending: STUDENT IN AN ORGANIZED HEALTH CARE EDUCATION/TRAINING PROGRAM
Payer: MEDICARE

## 2025-01-27 PROCEDURE — 97110 THERAPEUTIC EXERCISES: CPT

## 2025-01-27 NOTE — PROGRESS NOTES
Issa Sanderson  : 1950  Primary: Medicare Part A And B (Medicare)  Secondary: [employer] HE[employer] C[employer] MEDICARE SUPP [facility] [place] [place] [address]  Phone: [phone]  Fax: [phone] Plan Frequency: 2/week x 12 weeks    Plan of Care/Certification Expiration Date: 25        Plan of Care/Certification Expiration Date:  Plan of Care/Certification Expiration Date: 25    Frequency/Duration:   Plan Frequency: 2/week x 12 weeks      Time In/Out:   Time In: 1030  Time Out: 1115      PT Visit Info:    Total # of Visits to Date: 20      Visit Count:  20    OUTPATIENT PHYSICAL THERAPY:   Treatment Note 2025       Episode  (low back and hip pain)               Treatment Diagnosis:    No data found  Medical/Referring Diagnosis:    Chronic left-sided low back pain, unspecified whether sciatica present  Unsteadiness on feet      Referring Physician:  Pascual Lares DO MD Orders:  PT Eval and Treat   Return MD Appt:  25   Date of Onset:  Onset Date: 24     Allergies:   Patient has no known allergies.  Restrictions/Precautions:   None      Interventions Planned (Treatment may consist of any combination of the following):  Current Treatment Recommendations: Strengthening; ROM; Home exercise program; Manual; Aquatics; Stair training    See Assessment Note    Subjective Comments:  It seems like it is helping but I raked for awhile and could tell my hip was worse    Initial Pain Level::     0 /10    Post Session Pain Level:      0  /10  Medications Last Reviewed:  2025  Updated Objective Findings:  None Today  Treatment   THERAPEUTIC EXERCISE: (45 minutes):    Exercises per grid below to improve mobility.  Required minimal visual and verbal cues to promote proper body alignment and promote proper body posture.  Progressed range and repetitions as indicated.   Kristian  25 Date  25 Date  25   Activity/Exercise

## 2025-01-29 ENCOUNTER — HOSPITAL ENCOUNTER (OUTPATIENT)
Dept: PHYSICAL THERAPY | Age: 75
Setting detail: RECURRING SERIES
Discharge: HOME | End: 2025-02-01
Attending: STUDENT IN AN ORGANIZED HEALTH CARE EDUCATION/TRAINING PROGRAM
Payer: MEDICARE

## 2025-01-29 PROCEDURE — 97110 THERAPEUTIC EXERCISES: CPT

## 2025-01-29 NOTE — PROGRESS NOTES
Issa Sanderson  : 1950  Primary: Medicare Part A And B (Medicare)  Secondary: [employer]ARP HE[employer] C[employer] MEDICARE SUPP [facility] [place] [address]  Phone: [phone]  Fax: [phone] Plan Frequency: 2/week x 12 weeks    Plan of Care/Certification Expiration Date: 25        Plan of Care/Certification Expiration Date:  Plan of Care/Certification Expiration Date: 25    Frequency/Duration:   Plan Frequency: 2/week x 12 weeks      Time In/Out:   Time In: 1030  Time Out: 1115      PT Visit Info:    Total # of Visits to Date: 21      Visit Count:  21    OUTPATIENT PHYSICAL THERAPY:   Treatment Note 2025       Episode  (low back and hip pain)               Treatment Diagnosis:    Vertebrogenic low back pain  Pain in right hip  Difficulty in walking, not elsewhere classified  Medical/Referring Diagnosis:    Chronic left-sided low back pain, unspecified whether sciatica present  Unsteadiness on feet      Referring Physician:  Pascual Lares DO MD Orders:  PT Eval and Treat   Return MD Appt:  25   Date of Onset:  Onset Date: 24     Allergies:   Patient has no known allergies.  Restrictions/Precautions:   None      Interventions Planned (Treatment may consist of any combination of the following):  Current Treatment Recommendations: Strengthening; ROM; Home exercise program; Manual; Aquatics; Stair training    See Assessment Note    Subjective Comments: My hip pain is better- the side glides help.    Initial Pain Level::     0 /10    Post Session Pain Level:      0  /10  Medications Last Reviewed:  2025  Updated Objective Findings:  None Today  Treatment   THERAPEUTIC EXERCISE: (45 minutes):    Exercises per grid below to improve mobility.  Required minimal visual and verbal cues to promote proper body alignment and promote proper body posture.  Progressed range and repetitions as indicated.   Kristian  25

## 2025-02-03 ENCOUNTER — HOSPITAL ENCOUNTER (OUTPATIENT)
Dept: PHYSICAL THERAPY | Age: 75
Setting detail: RECURRING SERIES
Discharge: HOME | End: 2025-02-06
Attending: STUDENT IN AN ORGANIZED HEALTH CARE EDUCATION/TRAINING PROGRAM
Payer: MEDICARE

## 2025-02-03 PROCEDURE — 97110 THERAPEUTIC EXERCISES: CPT

## 2025-02-03 NOTE — PROGRESS NOTES
Issa Sanderson  : 1950  Primary: Medicare Part A And B (Medicare)  Secondary: [employer]ARP HE[employer] CARE MEDICARE SUPP [facility] [place] [address]  Phone: [phone]  Fax: [phone] Plan Frequency: 2/week x 12 weeks    Plan of Care/Certification Expiration Date: 25        Plan of Care/Certification Expiration Date:  Plan of Care/Certification Expiration Date: 25    Frequency/Duration:   Plan Frequency: 2/week x 12 weeks      Time In/Out:          PT Visit Info:    Total # of Visits to Date: 21      Visit Count:  22    OUTPATIENT PHYSICAL THERAPY:   Treatment Note 2/3/2025       Episode  (low back and hip pain)               Treatment Diagnosis:    Vertebrogenic low back pain  Pain in right hip  Difficulty in walking, not elsewhere classified  Medical/Referring Diagnosis:    Chronic left-sided low back pain, unspecified whether sciatica present  Unsteadiness on feet      Referring Physician:  Pascual Lares DO MD Orders:  PT Eval and Treat   Return MD Appt:  25   Date of Onset:  Onset Date: 24     Allergies:   Patient has no known allergies.  Restrictions/Precautions:   None      Interventions Planned (Treatment may consist of any combination of the following):  Current Treatment Recommendations: Strengthening; ROM; Home exercise program; Manual; Aquatics; Stair training    See Assessment Note    Subjective Comments: I am managing the hip pain with the side glides    Initial Pain Level::     0 /10    Post Session Pain Level:      0  /10  Medications Last Reviewed:  2/3/2025  Updated Objective Findings:  None Today  Treatment   THERAPEUTIC EXERCISE: (45 minutes):    Exercises per grid below to improve mobility.  Required minimal visual and verbal cues to promote proper body alignment and promote proper body posture.  Progressed range and repetitions as indicated.   Kristian  25 Date  25 Date  25

## 2025-02-05 ENCOUNTER — HOSPITAL ENCOUNTER (OUTPATIENT)
Dept: PHYSICAL THERAPY | Age: 75
Setting detail: RECURRING SERIES
Discharge: HOME | End: 2025-02-08
Attending: STUDENT IN AN ORGANIZED HEALTH CARE EDUCATION/TRAINING PROGRAM
Payer: MEDICARE

## 2025-02-05 PROCEDURE — 97110 THERAPEUTIC EXERCISES: CPT

## 2025-02-05 NOTE — PROGRESS NOTES
Issa Sanderson  : 1950  Primary: Medicare Part A And B  Secondary: AARP HEALTH CARE MEDICARE SUPP [facility] @ [facility] [place]  [facility] [place] [facility] [address]  Phone: [phone]  Fax: [phone] Plan Frequency: 2/week x 12 weeks    Plan of Care/Certification Expiration Date: 25      PT Visit Info:  Total # of Visits to Date: 21         OUTPATIENT PHYSICAL THERAPY 2/3/2025     Appt Desk   Episode   MyChart      I am accessing Mr. Sanderson's chart as a part of our department's internal chart auditing process. I certify that Mr. Sanderson is, or was, a patient in our department.    Thank you,    Danny Laughlin, PT  2025

## 2025-02-05 NOTE — PROGRESS NOTES
Issa Sanderson  : 1950  Primary: Medicare Part A And B (Medicare)  Secondary: AARP HEALTH CARE MEDICARE SUPP [facility] [place] [address]  Phone: [phone]  Fax: [phone] Plan Frequency: 2/week x 12 weeks    Plan of Care/Certification Expiration Date: 25        Plan of Care/Certification Expiration Date:  Plan of Care/Certification Expiration Date: 25    Frequency/Duration:   Plan Frequency: 2/week x 12 weeks      Time In/Out:   Time In: 1030  Time Out: 1130      PT Visit Info:    Total # of Visits to Date:       Visit Count:  23    OUTPATIENT PHYSICAL THERAPY:   Treatment Note 2025       Episode  (low back and hip pain)               Treatment Diagnosis:    Vertebrogenic low back pain  Pain in right hip  Difficulty in walking, not elsewhere classified  Medical/Referring Diagnosis:    Chronic left-sided low back pain, unspecified whether sciatica present  Unsteadiness on feet      Referring Physician:  Pascual Lares DO MD Orders:  PT Eval and Treat   Return MD Appt:  25   Date of Onset:  Onset Date: 24     Allergies:   Patient has no known allergies.  Restrictions/Precautions:   None      Interventions Planned (Treatment may consist of any combination of the following):  Current Treatment Recommendations: Strengthening; ROM; Home exercise program; Manual; Aquatics; Stair training    See Assessment Note    Subjective Comments: I am tired and stiff today. I worked a lot yesterday    Initial Pain Level::     0 /10    Post Session Pain Level:      0  /10  Medications Last Reviewed:  2025  Updated Objective Findings:  None Today  Treatment   THERAPEUTIC EXERCISE: (60 minutes):    Exercises per grid below to improve mobility.  Required minimal visual and verbal cues to promote proper body alignment and promote proper body posture.  Progressed range and repetitions as indicated.   Date  25

## 2025-02-11 ENCOUNTER — HOSPITAL ENCOUNTER (OUTPATIENT)
Dept: PHYSICAL THERAPY | Age: 75
Setting detail: RECURRING SERIES
Discharge: HOME | End: 2025-02-14
Attending: STUDENT IN AN ORGANIZED HEALTH CARE EDUCATION/TRAINING PROGRAM
Payer: MEDICARE

## 2025-02-11 PROCEDURE — 97110 THERAPEUTIC EXERCISES: CPT

## 2025-02-11 NOTE — PROGRESS NOTES
Issa Sanderson  : 1950  Primary: Medicare Part A And B (Medicare)  Secondary: AARP HEALTH CARE MEDICARE SUPP [facility] [place] [address]  Phone: [phone]  Fax: [phone] Plan Frequency: 2/week x 12 weeks    Plan of Care/Certification Expiration Date: 25        Plan of Care/Certification Expiration Date:  Plan of Care/Certification Expiration Date: 25    Frequency/Duration:   Plan Frequency: 2/week x 12 weeks      Time In/Out:   Time In: 1030  Time Out: 1115      PT Visit Info:    Total # of Visits to Date: 24      Visit Count:  24    OUTPATIENT PHYSICAL THERAPY:   Treatment Note 2025       Episode  (low back and hip pain)               Treatment Diagnosis:    Vertebrogenic low back pain  Pain in right hip  Difficulty in walking, not elsewhere classified  Medical/Referring Diagnosis:    Chronic left-sided low back pain, unspecified whether sciatica present  Unsteadiness on feet      Referring Physician:  Pascual Lares DO MD Orders:  PT Eval and Treat   Return MD Appt:  25   Date of Onset:  Onset Date: 24     Allergies:   Patient has no known allergies.  Restrictions/Precautions:   None      Interventions Planned (Treatment may consist of any combination of the following):  Current Treatment Recommendations: Strengthening; ROM; Home exercise program; Manual; Aquatics; Stair training    See Assessment Note    Subjective Comments: Hip is about the same.  It bothers me more after I do a lot of work bending forward but I can do the side glides and it goes away.  It still wakes me up at night.    Initial Pain Level::     0 /10    Post Session Pain Level:      0  /10  Medications Last Reviewed:  2025  Updated Objective Findings:  None Today  Treatment   THERAPEUTIC EXERCISE: (45 minutes):    Exercises per grid below to improve mobility.  Required minimal visual and verbal cues to promote proper body

## 2025-02-13 ENCOUNTER — HOSPITAL ENCOUNTER (OUTPATIENT)
Dept: PHYSICAL THERAPY | Age: 75
Setting detail: RECURRING SERIES
Discharge: HOME | End: 2025-02-16
Attending: STUDENT IN AN ORGANIZED HEALTH CARE EDUCATION/TRAINING PROGRAM
Payer: MEDICARE

## 2025-02-13 PROCEDURE — 97110 THERAPEUTIC EXERCISES: CPT

## 2025-02-13 NOTE — PROGRESS NOTES
Issa Sanderson  : 1950  Primary: Medicare Part A And B (Medicare)  Secondary: AARP HEALTH CARE MEDICARE SUPP [facility] [place]  [address] [place] [address]  Phone: [phone]  Fax: [phone] Plan Frequency: 2/week x 12 weeks    Plan of Care/Certification Expiration Date: 25        Plan of Care/Certification Expiration Date:  Plan of Care/Certification Expiration Date: 25    Frequency/Duration:   Plan Frequency: 2/week x 12 weeks      Time In/Out:   Time In: 1030  Time Out: 1115      PT Visit Info:    Total # of Visits to Date:       Visit Count:  25    OUTPATIENT PHYSICAL THERAPY:   Treatment Note 2025       Episode  (low back and hip pain)               Treatment Diagnosis:    Vertebrogenic low back pain  Pain in right hip  Difficulty in walking, not elsewhere classified  Medical/Referring Diagnosis:    Chronic left-sided low back pain, unspecified whether sciatica present  Unsteadiness on feet      Referring Physician:  Pascual Lares DO MD Orders:  PT Eval and Treat   Return MD Appt:  25   Date of Onset:  Onset Date: 24     Allergies:   Patient has no known allergies.  Restrictions/Precautions:   None      Interventions Planned (Treatment may consist of any combination of the following):  Current Treatment Recommendations: Strengthening; ROM; Home exercise program; Manual; Aquatics; Stair training    See Assessment Note    Subjective Comments: Hip pain slightly improved.  Also states he is able to sleep on his back some since his upper back and neck posture is better    Initial Pain Level::     0 /10    Post Session Pain Level:      0  /10  Medications Last Reviewed:  2025  Updated Objective Findings:  None Today  Treatment   THERAPEUTIC EXERCISE: (45 minutes):    Exercises per grid below to improve mobility.  Required minimal visual and verbal cues to promote proper body alignment and promote proper body

## 2025-02-14 DIAGNOSIS — E83.110 HEREDITARY HEMOCHROMATOSIS: ICD-10-CM

## 2025-02-14 LAB
ALBUMIN SERPL-MCNC: 3.5 G/DL (ref 3.2–4.6)
ALBUMIN/GLOB SERPL: 1.2 (ref 1–1.9)
ALP SERPL-CCNC: 56 U/L (ref 40–129)
ALT SERPL-CCNC: 13 U/L (ref 8–55)
ANION GAP SERPL CALC-SCNC: 7 MMOL/L (ref 7–16)
AST SERPL-CCNC: 20 U/L (ref 15–37)
BASOPHILS # BLD: 0.03 K/UL (ref 0–0.2)
BASOPHILS NFR BLD: 0.4 % (ref 0–2)
BILIRUB SERPL-MCNC: 0.6 MG/DL (ref 0–1.2)
BUN SERPL-MCNC: 12 MG/DL (ref 8–23)
CALCIUM SERPL-MCNC: 9.5 MG/DL (ref 8.8–10.2)
CHLORIDE SERPL-SCNC: 106 MMOL/L (ref 98–107)
CO2 SERPL-SCNC: 30 MMOL/L (ref 20–29)
CREAT SERPL-MCNC: 0.97 MG/DL (ref 0.8–1.3)
DIFFERENTIAL METHOD BLD: NORMAL
EOSINOPHIL # BLD: 0.07 K/UL (ref 0–0.8)
EOSINOPHIL NFR BLD: 1 % (ref 0.5–7.8)
ERYTHROCYTE [DISTWIDTH] IN BLOOD BY AUTOMATED COUNT: 12.9 % (ref 11.9–14.6)
FERRITIN SERPL-MCNC: 58 NG/ML (ref 8–388)
GLOBULIN SER CALC-MCNC: 3 G/DL (ref 2.3–3.5)
GLUCOSE SERPL-MCNC: 100 MG/DL (ref 70–99)
HCT VFR BLD AUTO: 45.1 % (ref 41.1–50.3)
HGB BLD-MCNC: 15.4 G/DL (ref 13.6–17.2)
IMM GRANULOCYTES # BLD AUTO: 0.01 K/UL (ref 0–0.5)
IMM GRANULOCYTES NFR BLD AUTO: 0.1 % (ref 0–5)
IRON SATN MFR SERPL: 54 % (ref 20–50)
IRON SERPL-MCNC: 135 UG/DL (ref 35–100)
LYMPHOCYTES # BLD: 3.09 K/UL (ref 0.5–4.6)
LYMPHOCYTES NFR BLD: 42.6 % (ref 13–44)
MCH RBC QN AUTO: 31.8 PG (ref 26.1–32.9)
MCHC RBC AUTO-ENTMCNC: 34.1 G/DL (ref 31.4–35)
MCV RBC AUTO: 93.2 FL (ref 82–102)
MONOCYTES # BLD: 0.82 K/UL (ref 0.1–1.3)
MONOCYTES NFR BLD: 11.3 % (ref 4–12)
NEUTS SEG # BLD: 3.24 K/UL (ref 1.7–8.2)
NEUTS SEG NFR BLD: 44.6 % (ref 43–78)
NRBC # BLD: 0 K/UL (ref 0–0.2)
PLATELET # BLD AUTO: 212 K/UL (ref 150–450)
PMV BLD AUTO: 10.8 FL (ref 9.4–12.3)
POTASSIUM SERPL-SCNC: 4.6 MMOL/L (ref 3.5–5.1)
PROT SERPL-MCNC: 6.5 G/DL (ref 6.3–8.2)
RBC # BLD AUTO: 4.84 M/UL (ref 4.23–5.6)
SODIUM SERPL-SCNC: 143 MMOL/L (ref 136–145)
TIBC SERPL-MCNC: 251 UG/DL (ref 240–450)
UIBC SERPL-MCNC: 116 UG/DL (ref 112–347)
WBC # BLD AUTO: 7.3 K/UL (ref 4.3–11.1)

## 2025-02-17 ENCOUNTER — HOSPITAL ENCOUNTER (OUTPATIENT)
Dept: PHYSICAL THERAPY | Age: 75
Setting detail: RECURRING SERIES
Discharge: HOME | End: 2025-02-20
Attending: STUDENT IN AN ORGANIZED HEALTH CARE EDUCATION/TRAINING PROGRAM
Payer: MEDICARE

## 2025-02-17 DIAGNOSIS — E83.110 HEREDITARY HEMOCHROMATOSIS: Primary | ICD-10-CM

## 2025-02-17 PROCEDURE — 97110 THERAPEUTIC EXERCISES: CPT

## 2025-02-17 NOTE — PROGRESS NOTES
Issa Sanderson  : 1950  Primary: Medicare Part A And B (Medicare)  Secondary: AARP HEALTH CARE MEDICARE SUPP [facility] [place] [address]  Phone: [phone]  Fax: [phone] Plan Frequency: 2/week x 12 weeks    Plan of Care/Certification Expiration Date: 25        Plan of Care/Certification Expiration Date:  Plan of Care/Certification Expiration Date: 25    Frequency/Duration:   Plan Frequency: 2/week x 12 weeks      Time In/Out:   Time In: 1030  Time Out: 1115      PT Visit Info:    Total # of Visits to Date:       Visit Count:  26    OUTPATIENT PHYSICAL THERAPY:   Treatment Note 2025       Episode  (low back and hip pain)               Treatment Diagnosis:    Vertebrogenic low back pain  Pain in right hip  Difficulty in walking, not elsewhere classified  Medical/Referring Diagnosis:    Chronic left-sided low back pain, unspecified whether sciatica present  Unsteadiness on feet      Referring Physician:  Pascual Lares DO MD Orders:  PT Eval and Treat   Return MD Appt:  25   Date of Onset:  Onset Date: 24     Allergies:   Patient has no known allergies.  Restrictions/Precautions:   None      Interventions Planned (Treatment may consist of any combination of the following):  Current Treatment Recommendations: Strengthening; ROM; Home exercise program; Manual; Aquatics; Stair training    See Assessment Note    Subjective Comments:  states his hip is intermittent.  Had a good night then it woke him the next night. Generally does not bother him during the day unless he sits for long periods.  He continues to have R shoulder weakness and tightness    Initial Pain Level::     0 /10    Post Session Pain Level:      0  /10  Medications Last Reviewed:  2025  Updated Objective Findings:  None Today  Treatment   THERAPEUTIC EXERCISE: (45 minutes):    Exercises per grid below to improve mobility.  Required

## 2025-02-19 ENCOUNTER — HOSPITAL ENCOUNTER (OUTPATIENT)
Dept: PHYSICAL THERAPY | Age: 75
Setting detail: RECURRING SERIES
Discharge: HOME | End: 2025-02-22
Attending: STUDENT IN AN ORGANIZED HEALTH CARE EDUCATION/TRAINING PROGRAM
Payer: MEDICARE

## 2025-02-19 PROCEDURE — 97110 THERAPEUTIC EXERCISES: CPT

## 2025-02-19 NOTE — PROGRESS NOTES
Issa Sanderson  : 1950  Primary: Medicare Part A And B (Medicare)  Secondary: AARP HEALTH CARE MEDICARE SUPP [facility] [address]  [facility] [address]  Phone: [phone]  Fax: [phone] Plan Frequency: 2/week x 12 weeks    Plan of Care/Certification Expiration Date: 25        Plan of Care/Certification Expiration Date:  Plan of Care/Certification Expiration Date: 25    Frequency/Duration:   Plan Frequency: 2/week x 12 weeks      Time In/Out:   Time In: 1030  Time Out: 1125      PT Visit Info:    Total # of Visits to Date:       Visit Count:  27    OUTPATIENT PHYSICAL THERAPY:   Treatment Note 2025       Episode  (low back and hip pain)               Treatment Diagnosis:    Vertebrogenic low back pain  Pain in right hip  Difficulty in walking, not elsewhere classified  Medical/Referring Diagnosis:    Chronic left-sided low back pain, unspecified whether sciatica present  Unsteadiness on feet      Referring Physician:  Pascual Lares DO MD Orders:  PT Eval and Treat   Return MD Appt:  25   Date of Onset:  Onset Date: 24     Allergies:   Patient has no known allergies.  Restrictions/Precautions:   None      Interventions Planned (Treatment may consist of any combination of the following):  Current Treatment Recommendations: Strengthening; ROM; Home exercise program; Manual; Aquatics; Stair training    See Assessment Note    Subjective Comments:  Hip began hurting when I took a walk in my neighborhood.  It did not last long.  Initial Pain Level::     0 /10    Post Session Pain Level:      0  /10  Medications Last Reviewed:  2025  Updated Objective Findings:  None Today  Treatment   THERAPEUTIC EXERCISE: (45 minutes):    Exercises per grid below to improve mobility.  Required minimal visual and verbal cues to promote proper body alignment and promote proper body posture.  Progressed range and repetitions as

## 2025-02-21 ENCOUNTER — OFFICE VISIT (OUTPATIENT)
Dept: ONCOLOGY | Age: 75
End: 2025-02-21

## 2025-02-21 VITALS
DIASTOLIC BLOOD PRESSURE: 64 MMHG | HEIGHT: 69 IN | RESPIRATION RATE: 12 BRPM | HEART RATE: 64 BPM | TEMPERATURE: 98.7 F | WEIGHT: 165 LBS | BODY MASS INDEX: 24.44 KG/M2 | OXYGEN SATURATION: 98 % | SYSTOLIC BLOOD PRESSURE: 122 MMHG

## 2025-02-21 DIAGNOSIS — D46.1: ICD-10-CM

## 2025-02-21 DIAGNOSIS — E83.110 HEREDITARY HEMOCHROMATOSIS: Primary | ICD-10-CM

## 2025-02-21 NOTE — ASSESSMENT & PLAN NOTE
Positive gene analysis on 10/16/2023   Labs from 2/14/2025 with normal hemoglobin and hematocrit, iron saturation slightly elevated at 54%, normal ferritin  Undergoes regular blood donations as recommended by hematology, currently every 3 to 4 months

## 2025-02-21 NOTE — PROGRESS NOTES
Eosinophils % 1.0 0.5 - 7.8 %    Basophils % 0.4 0.0 - 2.0 %    Immature Granulocytes % 0.1 0.0 - 5.0 %    Neutrophils Absolute 3.24 1.70 - 8.20 K/UL    Lymphocytes Absolute 3.09 0.50 - 4.60 K/UL    Monocytes Absolute 0.82 0.10 - 1.30 K/UL    Eosinophils Absolute 0.07 0.00 - 0.80 K/UL    Basophils Absolute 0.03 0.00 - 0.20 K/UL    Immature Granulocytes Absolute 0.01 0.0 - 0.5 K/UL          Imaging:  No results found.    ASSESSMENT:   Diagnosis Orders   1. Hereditary hemochromatosis        2. Refractory anemia with hemochromatosis (HCC)                  Patient Active Problem List   Diagnosis    Primary osteoarthritis of right hip    Diverticulosis of large intestine without hemorrhage    ED (erectile dysfunction)    Hyperlipemia    Osteopenia    Essential tremor    IFG (impaired fasting glucose)    Right hip pain    Suspected sleep apnea    Snoring    Witnessed apneic spells    Hypersomnia    RLS (restless legs syndrome)    Hereditary hemochromatosis    Refractory anemia with hemochromatosis (HCC)           PLAN:  Lab studies were personally reviewed.    Pertinent old records were reviewed.     Hereditary hemochromatosis: with elevated ferritin noted incidentally on labs done for RLS, repeat ferritin 334, TSAT 46%.  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  It is encouraging that he is 73 and has never had any iron overload symptoms, but I would still favor phlebotomy to decrease his risk of end-organ damage.  This can be done weekly for 4 weeks, then recheck CBC and ferritin with a goal of  (and minimal anemia).     Assessment & Plan  1. Hereditary Hemochromatosis.  His ferritin levels are within the desired range, indicating effective management of the condition. The current schedule of blood donations every 3 to 4 months is appropriate and should be maintained. It was explained that the percentage of iron saturation may fluctuate and is less accurate than ferritin levels for serial

## 2025-02-21 NOTE — PROGRESS NOTES
Issa Sanderson (:  1950) is a 75 y.o. male,Established patient, here for evaluation of the following chief complaint(s):  Medicare AWV (Pt is here for his yearly AWV. )         Assessment & Plan  Medicare annual wellness visit, subsequent  Medicare wellness responses reviewed in the office today  PSA within normal limits on 10/18/2024  Obtain GI records regarding last colonoscopy         Dyslipidemia  Lipid panel from 10/18/2024 with values at goal  Continue current dose of rosuvastatin    Orders:    rosuvastatin (CRESTOR) 20 MG tablet; Take 0.5 tablets by mouth nightly    Hereditary hemochromatosis  Positive gene analysis on 10/16/2023   Labs from 2025 with normal hemoglobin and hematocrit, iron saturation slightly elevated at 54%, normal ferritin  Undergoes regular blood donations as recommended by hematology, currently every 3 to 4 months       Osteopenia of neck of right femur  DEXA scan on 3/22/2023 with 10-year fracture risk of 13.4%, hip fracture risk of 4.5%  Repeat DEXA scan ordered  Continue calcium and vitamin D supplementation    Orders:    DEXA BONE DENSITY AXIAL SKELETON; Future    Erectile dysfunction, unspecified erectile dysfunction type  Lack of improvement with sildenafil  Currently on as needed tadalafil    Orders:    tadalafil (CIALIS) 20 MG tablet; Take 1 whole tablet by mouth 30 to 60 minutes prior to sexual activity as needed for ED.  Max of 1 tablet per 24 hours.  Go to ER if erection lasting more than 4 hours.    Chronic left-sided low back pain without sciatica  Likely lumbar spondylosis and degenerative disc disease  Previously provided home exercises and treated with oral steroids  Currently undergoing physical therapy with improvement           Return in about 6 months (around 2025).       Subjective   He patient is a 75-year-old  male who presents to the office today for follow-up and Medicare wellness visit.  Patient is doing physical therapy

## 2025-02-21 NOTE — ASSESSMENT & PLAN NOTE
Lack of improvement with sildenafil  Currently on as needed tadalafil    Orders:    tadalafil (CIALIS) 20 MG tablet; Take 1 whole tablet by mouth 30 to 60 minutes prior to sexual activity as needed for ED.  Max of 1 tablet per 24 hours.  Go to ER if erection lasting more than 4 hours.

## 2025-02-21 NOTE — ASSESSMENT & PLAN NOTE
DEXA scan on 3/22/2023 with 10-year fracture risk of 13.4%, hip fracture risk of 4.5%  Repeat DEXA scan ordered  Continue calcium and vitamin D supplementation    Orders:    DEXA BONE DENSITY AXIAL SKELETON; Future

## 2025-02-24 ENCOUNTER — HOSPITAL ENCOUNTER (OUTPATIENT)
Dept: PHYSICAL THERAPY | Age: 75
Setting detail: RECURRING SERIES
Discharge: HOME | End: 2025-02-27
Attending: STUDENT IN AN ORGANIZED HEALTH CARE EDUCATION/TRAINING PROGRAM
Payer: MEDICARE

## 2025-02-24 PROCEDURE — 97110 THERAPEUTIC EXERCISES: CPT

## 2025-02-24 NOTE — PROGRESS NOTES
Issa Sanderson  : 1950  Primary: Medicare Part A And B (Medicare)  Secondary: [employer] MEDICARE SUPP [facility] [place] [address]  Phone: [phone]  Fax: [phone] Plan Frequency: 2/week x 12 weeks    Plan of Care/Certification Expiration Date: 25        Plan of Care/Certification Expiration Date:  Plan of Care/Certification Expiration Date: 25    Frequency/Duration:   Plan Frequency: 2/week x 12 weeks      Time In/Out:   Time In: 1030  Time Out: 1115      PT Visit Info:    Total # of Visits to Date:       Visit Count:  28    OUTPATIENT PHYSICAL THERAPY:   Treatment Note 2025       Episode  (low back and hip pain)               Treatment Diagnosis:    Vertebrogenic low back pain  Pain in right hip  Difficulty in walking, not elsewhere classified  Medical/Referring Diagnosis:    Chronic left-sided low back pain, unspecified whether sciatica present  Unsteadiness on feet      Referring Physician:  Pascual Lares DO MD Orders:  PT Eval and Treat   Return MD Appt:  25   Date of Onset:  Onset Date: 24     Allergies:   Patient has no known allergies.  Restrictions/Precautions:   None      Interventions Planned (Treatment may consist of any combination of the following):  Current Treatment Recommendations: Strengthening; ROM; Home exercise program; Manual; Aquatics; Stair training    See Assessment Note    Subjective Comments:  The hip goes away a little quicker when it starts to hurt.    Initial Pain Level::     0 /10    Post Session Pain Level:      0  /10  Medications Last Reviewed:  2025  Updated Objective Findings:  None Today  Treatment   THERAPEUTIC EXERCISE: (45 minutes):    Exercises per grid below to improve mobility.  Required minimal visual and verbal cues to promote proper body alignment and promote proper body posture.  Progressed range and repetitions as indicated.

## 2025-02-25 SDOH — ECONOMIC STABILITY: INCOME INSECURITY: IN THE LAST 12 MONTHS, WAS THERE A TIME WHEN YOU WERE NOT ABLE TO PAY THE MORTGAGE OR RENT ON TIME?: NO

## 2025-02-25 SDOH — ECONOMIC STABILITY: TRANSPORTATION INSECURITY
IN THE PAST 12 MONTHS, HAS THE LACK OF TRANSPORTATION KEPT YOU FROM MEDICAL APPOINTMENTS OR FROM GETTING MEDICATIONS?: NO

## 2025-02-25 SDOH — ECONOMIC STABILITY: FOOD INSECURITY: WITHIN THE PAST 12 MONTHS, THE FOOD YOU BOUGHT JUST DIDN'T LAST AND YOU DIDN'T HAVE MONEY TO GET MORE.: NEVER TRUE

## 2025-02-25 SDOH — ECONOMIC STABILITY: FOOD INSECURITY: WITHIN THE PAST 12 MONTHS, YOU WORRIED THAT YOUR FOOD WOULD RUN OUT BEFORE YOU GOT MONEY TO BUY MORE.: NEVER TRUE

## 2025-02-26 ENCOUNTER — HOSPITAL ENCOUNTER (OUTPATIENT)
Dept: PHYSICAL THERAPY | Age: 75
Setting detail: RECURRING SERIES
Discharge: HOME | End: 2025-03-01
Attending: STUDENT IN AN ORGANIZED HEALTH CARE EDUCATION/TRAINING PROGRAM
Payer: MEDICARE

## 2025-02-26 PROCEDURE — 97110 THERAPEUTIC EXERCISES: CPT

## 2025-02-28 ENCOUNTER — OFFICE VISIT (OUTPATIENT)
Dept: INTERNAL MEDICINE CLINIC | Facility: CLINIC | Age: 75
End: 2025-02-28

## 2025-02-28 VITALS
SYSTOLIC BLOOD PRESSURE: 118 MMHG | BODY MASS INDEX: 24.44 KG/M2 | WEIGHT: 165 LBS | HEART RATE: 59 BPM | DIASTOLIC BLOOD PRESSURE: 82 MMHG | OXYGEN SATURATION: 97 % | TEMPERATURE: 97.5 F | HEIGHT: 69 IN

## 2025-02-28 DIAGNOSIS — E83.110 HEREDITARY HEMOCHROMATOSIS: ICD-10-CM

## 2025-02-28 DIAGNOSIS — M54.50 CHRONIC LEFT-SIDED LOW BACK PAIN WITHOUT SCIATICA: ICD-10-CM

## 2025-02-28 DIAGNOSIS — M85.851 OSTEOPENIA OF NECK OF RIGHT FEMUR: ICD-10-CM

## 2025-02-28 DIAGNOSIS — Z00.00 MEDICARE ANNUAL WELLNESS VISIT, SUBSEQUENT: Primary | ICD-10-CM

## 2025-02-28 DIAGNOSIS — E78.5 DYSLIPIDEMIA: ICD-10-CM

## 2025-02-28 DIAGNOSIS — G89.29 CHRONIC LEFT-SIDED LOW BACK PAIN WITHOUT SCIATICA: ICD-10-CM

## 2025-02-28 DIAGNOSIS — N52.9 ERECTILE DYSFUNCTION, UNSPECIFIED ERECTILE DYSFUNCTION TYPE: ICD-10-CM

## 2025-02-28 RX ORDER — TADALAFIL 20 MG/1
TABLET ORAL
Qty: 10 TABLET | Refills: 5 | Status: SHIPPED | OUTPATIENT
Start: 2025-02-28

## 2025-02-28 RX ORDER — ROSUVASTATIN CALCIUM 20 MG/1
10 TABLET, COATED ORAL NIGHTLY
Qty: 90 TABLET | Refills: 2 | Status: SHIPPED | OUTPATIENT
Start: 2025-02-28

## 2025-02-28 SDOH — HEALTH STABILITY: PHYSICAL HEALTH: ON AVERAGE, HOW MANY DAYS PER WEEK DO YOU ENGAGE IN MODERATE TO STRENUOUS EXERCISE (LIKE A BRISK WALK)?: 3 DAYS

## 2025-02-28 SDOH — HEALTH STABILITY: PHYSICAL HEALTH: ON AVERAGE, HOW MANY MINUTES DO YOU ENGAGE IN EXERCISE AT THIS LEVEL?: 20 MIN

## 2025-02-28 ASSESSMENT — PATIENT HEALTH QUESTIONNAIRE - PHQ9
SUM OF ALL RESPONSES TO PHQ9 QUESTIONS 1 & 2: 0
SUM OF ALL RESPONSES TO PHQ QUESTIONS 1-9: 0
SUM OF ALL RESPONSES TO PHQ QUESTIONS 1-9: 0
SUM OF ALL RESPONSES TO PHQ9 QUESTIONS 1 & 2: 0
SUM OF ALL RESPONSES TO PHQ QUESTIONS 1-9: 0
1. LITTLE INTEREST OR PLEASURE IN DOING THINGS: NOT AT ALL
1. LITTLE INTEREST OR PLEASURE IN DOING THINGS: NOT AT ALL
SUM OF ALL RESPONSES TO PHQ QUESTIONS 1-9: 0
SUM OF ALL RESPONSES TO PHQ QUESTIONS 1-9: 0
2. FEELING DOWN, DEPRESSED OR HOPELESS: NOT AT ALL
2. FEELING DOWN, DEPRESSED OR HOPELESS: NOT AT ALL
SUM OF ALL RESPONSES TO PHQ QUESTIONS 1-9: 0

## 2025-02-28 ASSESSMENT — LIFESTYLE VARIABLES
HOW OFTEN DO YOU HAVE A DRINK CONTAINING ALCOHOL: 4 OR MORE TIMES A WEEK
HAVE YOU OR SOMEONE ELSE BEEN INJURED AS A RESULT OF YOUR DRINKING: NO
HOW OFTEN DURING THE LAST YEAR HAVE YOU HAD A FEELING OF GUILT OR REMORSE AFTER DRINKING: NEVER
HOW OFTEN DURING THE LAST YEAR HAVE YOU NEEDED AN ALCOHOLIC DRINK FIRST THING IN THE MORNING TO GET YOURSELF GOING AFTER A NIGHT OF HEAVY DRINKING: NEVER
HOW OFTEN DO YOU HAVE A DRINK CONTAINING ALCOHOL: 4 OR MORE TIMES A WEEK
HOW OFTEN DURING THE LAST YEAR HAVE YOU FAILED TO DO WHAT WAS NORMALLY EXPECTED FROM YOU BECAUSE OF DRINKING: NEVER
HOW OFTEN DURING THE LAST YEAR HAVE YOU FOUND THAT YOU WERE NOT ABLE TO STOP DRINKING ONCE YOU HAD STARTED: NEVER
HOW OFTEN DURING THE LAST YEAR HAVE YOU FOUND THAT YOU WERE NOT ABLE TO STOP DRINKING ONCE YOU HAD STARTED: NEVER
HAS A RELATIVE, FRIEND, DOCTOR, OR ANOTHER HEALTH PROFESSIONAL EXPRESSED CONCERN ABOUT YOUR DRINKING OR SUGGESTED YOU CUT DOWN: NO
HOW OFTEN DURING THE LAST YEAR HAVE YOU BEEN UNABLE TO REMEMBER WHAT HAPPENED THE NIGHT BEFORE BECAUSE YOU HAD BEEN DRINKING: NEVER
HOW OFTEN DURING THE LAST YEAR HAVE YOU FOUND THAT YOU WERE NOT ABLE TO STOP DRINKING ONCE YOU HAD STARTED: NEVER
HOW OFTEN DURING THE LAST YEAR HAVE YOU HAD A FEELING OF GUILT OR REMORSE AFTER DRINKING: NEVER
HOW MANY STANDARD DRINKS CONTAINING ALCOHOL DO YOU HAVE ON A TYPICAL DAY: 1 OR 2
HOW OFTEN DURING THE LAST YEAR HAVE YOU FAILED TO DO WHAT WAS NORMALLY EXPECTED FROM YOU BECAUSE OF DRINKING: NEVER
HOW OFTEN DO YOU HAVE A DRINK CONTAINING ALCOHOL: 5
HAS A RELATIVE, FRIEND, DOCTOR, OR ANOTHER HEALTH PROFESSIONAL EXPRESSED CONCERN ABOUT YOUR DRINKING OR SUGGESTED YOU CUT DOWN: NO
HAVE YOU OR SOMEONE ELSE BEEN INJURED AS A RESULT OF YOUR DRINKING: NO
HOW MANY STANDARD DRINKS CONTAINING ALCOHOL DO YOU HAVE ON A TYPICAL DAY: 1 OR 2
HAS A RELATIVE, FRIEND, DOCTOR, OR ANOTHER HEALTH PROFESSIONAL EXPRESSED CONCERN ABOUT YOUR DRINKING OR SUGGESTED YOU CUT DOWN: NO
HOW OFTEN DO YOU HAVE SIX OR MORE DRINKS ON ONE OCCASION: 1
HOW OFTEN DURING THE LAST YEAR HAVE YOU BEEN UNABLE TO REMEMBER WHAT HAPPENED THE NIGHT BEFORE BECAUSE YOU HAD BEEN DRINKING: NEVER
HAVE YOU OR SOMEONE ELSE BEEN INJURED AS A RESULT OF YOUR DRINKING: NO
HOW MANY STANDARD DRINKS CONTAINING ALCOHOL DO YOU HAVE ON A TYPICAL DAY: 1
HOW OFTEN DURING THE LAST YEAR HAVE YOU BEEN UNABLE TO REMEMBER WHAT HAPPENED THE NIGHT BEFORE BECAUSE YOU HAD BEEN DRINKING: NEVER
HOW OFTEN DURING THE LAST YEAR HAVE YOU FAILED TO DO WHAT WAS NORMALLY EXPECTED FROM YOU BECAUSE OF DRINKING: NEVER
HOW OFTEN DURING THE LAST YEAR HAVE YOU NEEDED AN ALCOHOLIC DRINK FIRST THING IN THE MORNING TO GET YOURSELF GOING AFTER A NIGHT OF HEAVY DRINKING: NEVER
HOW OFTEN DURING THE LAST YEAR HAVE YOU NEEDED AN ALCOHOLIC DRINK FIRST THING IN THE MORNING TO GET YOURSELF GOING AFTER A NIGHT OF HEAVY DRINKING: NEVER
HOW OFTEN DURING THE LAST YEAR HAVE YOU HAD A FEELING OF GUILT OR REMORSE AFTER DRINKING: NEVER

## 2025-02-28 ASSESSMENT — ENCOUNTER SYMPTOMS
BLOOD IN STOOL: 0
ANAL BLEEDING: 0
ABDOMINAL PAIN: 0
BACK PAIN: 1
SHORTNESS OF BREATH: 0

## 2025-02-28 NOTE — PROGRESS NOTES
Medicare Annual Wellness Visit    Issa Sanderson is here for Medicare AWV (Pt is here for his yearly AWV. )    Assessment & Plan   Medicare annual wellness visit, subsequent  Dyslipidemia  -     rosuvastatin (CRESTOR) 20 MG tablet; Take 0.5 tablets by mouth nightly, Disp-90 tablet, R-2Normal  Hereditary hemochromatosis  Assessment & Plan:  Positive gene analysis on 10/16/2023   Labs from 2/14/2025 with normal hemoglobin and hematocrit, iron saturation slightly elevated at 54%, normal ferritin  Undergoes regular blood donations as recommended by hematology, currently every 3 to 4 months       Osteopenia of neck of right femur  Assessment & Plan:  DEXA scan on 3/22/2023 with 10-year fracture risk of 13.4%, hip fracture risk of 4.5%  Repeat DEXA scan ordered  Continue calcium and vitamin D supplementation    Orders:    DEXA BONE DENSITY AXIAL SKELETON; Future    Orders:  -     DEXA BONE DENSITY AXIAL SKELETON; Future  Erectile dysfunction, unspecified erectile dysfunction type  Assessment & Plan:  Lack of improvement with sildenafil  Currently on as needed tadalafil    Orders:    tadalafil (CIALIS) 20 MG tablet; Take 1 whole tablet by mouth 30 to 60 minutes prior to sexual activity as needed for ED.  Max of 1 tablet per 24 hours.  Go to ER if erection lasting more than 4 hours.    Orders:  -     tadalafil (CIALIS) 20 MG tablet; Take 1 whole tablet by mouth 30 to 60 minutes prior to sexual activity as needed for ED.  Max of 1 tablet per 24 hours.  Go to ER if erection lasting more than 4 hours., Disp-10 tablet, R-5Normal  Chronic left-sided low back pain without sciatica       Return in about 6 months (around 8/28/2025).     Subjective       Patient's complete Health Risk Assessment and screening values have been reviewed and are found in Flowsheets. The following problems were reviewed today and where indicated follow up appointments were made and/or referrals ordered.    Positive Risk Factor Screenings with

## 2025-03-03 ENCOUNTER — HOSPITAL ENCOUNTER (OUTPATIENT)
Dept: PHYSICAL THERAPY | Age: 75
Setting detail: RECURRING SERIES
Discharge: HOME | End: 2025-03-06
Attending: STUDENT IN AN ORGANIZED HEALTH CARE EDUCATION/TRAINING PROGRAM
Payer: MEDICARE

## 2025-03-03 PROCEDURE — 97110 THERAPEUTIC EXERCISES: CPT

## 2025-03-03 NOTE — PROGRESS NOTES
Issa Sanderson  : 1950  Primary: Medicare Part A And B (Medicare)  Secondary: [employer] MEDICARE SUPP [facility] [place] [address]  Phone: [phone]  Fax: [phone] Plan Frequency: 2/week x 12 weeks    Plan of Care/Certification Expiration Date: 25        Plan of Care/Certification Expiration Date:  Plan of Care/Certification Expiration Date: 25    Frequency/Duration:   Plan Frequency: 2/week x 12 weeks      Time In/Out:   Time In: 1015  Time Out: 1120      PT Visit Info:    Total # of Visits to Date: 30      Visit Count:  30    OUTPATIENT PHYSICAL THERAPY:   Treatment Note 3/3/2025       Episode  (low back and hip pain)               Treatment Diagnosis:    Vertebrogenic low back pain  Pain in right hip  Difficulty in walking, not elsewhere classified  Medical/Referring Diagnosis:    Chronic left-sided low back pain, unspecified whether sciatica present  Unsteadiness on feet      Referring Physician:  Pascual Lares DO MD Orders:  PT Eval and Treat   Return MD Appt:  25   Date of Onset:  Onset Date: 24     Allergies:   Patient has no known allergies.  Restrictions/Precautions:   None      Interventions Planned (Treatment may consist of any combination of the following):  Current Treatment Recommendations: Strengthening; ROM; Home exercise program; Manual; Aquatics; Stair training    See Assessment Note    Subjective Comments:  doing ok.  My hip is manageable it wakes me up and I reposition. But other than that it is pretty good   Initial Pain Level::     0 /10    Post Session Pain Level:      0  /10  Medications Last Reviewed:  3/3/2025  Updated Objective Findings:  None Today  Treatment   THERAPEUTIC EXERCISE: (50 minutes):    Exercises per grid below to improve mobility.  Required minimal visual and verbal cues to promote proper body alignment and promote proper body posture.  Progressed range

## 2025-03-05 ENCOUNTER — HOSPITAL ENCOUNTER (OUTPATIENT)
Dept: PHYSICAL THERAPY | Age: 75
Setting detail: RECURRING SERIES
Discharge: HOME | End: 2025-03-08
Attending: STUDENT IN AN ORGANIZED HEALTH CARE EDUCATION/TRAINING PROGRAM
Payer: MEDICARE

## 2025-03-05 PROCEDURE — 97110 THERAPEUTIC EXERCISES: CPT

## 2025-03-05 NOTE — PROGRESS NOTES
Issa Sanderson  : 1950  Primary: Medicare Part A And B (Medicare)  Secondary: [employer] MEDICARE SUPP [facility] [place] [address]  Phone: [phone]  Fax: [phone] Plan Frequency: 2/week x 12 weeks    Plan of Care/Certification Expiration Date: 25        Plan of Care/Certification Expiration Date:  Plan of Care/Certification Expiration Date: 25    Frequency/Duration:   Plan Frequency: 2/week x 12 weeks      Time In/Out:   Time In: 1030  Time Out: 1115      PT Visit Info:    Total # of Visits to Date: 31      Visit Count:  31    OUTPATIENT PHYSICAL THERAPY:   Treatment Note 3/5/2025       Episode  (low back and hip pain)               Treatment Diagnosis:    Vertebrogenic low back pain  Pain in right hip  Difficulty in walking, not elsewhere classified  Medical/Referring Diagnosis:    Chronic left-sided low back pain, unspecified whether sciatica present  Unsteadiness on feet      Referring Physician:  Pascual Lares DO MD Orders:  PT Eval and Treat   Return MD Appt:  25   Date of Onset:  Onset Date: 24     Allergies:   Patient has no known allergies.  Restrictions/Precautions:   None      Interventions Planned (Treatment may consist of any combination of the following):  Current Treatment Recommendations: Strengthening; ROM; Home exercise program; Manual; Aquatics; Stair training    See Assessment Note    Subjective Comments:  my hip bothered me a bit more yesterday but I sat around a lot.  If I do the side glides it is manageable   Initial Pain Level::     0 /10    Post Session Pain Level:      0  /10  Medications Last Reviewed:  3/5/2025  Updated Objective Findings:  None Today  Treatment   THERAPEUTIC EXERCISE: (45 minutes):    Exercises per grid below to improve mobility.  Required minimal visual and verbal cues to promote proper body alignment and promote proper body posture.  Progressed

## 2025-03-05 NOTE — THERAPY DISCHARGE
Issa Sanderson  : 1950  Primary: Medicare Part A And B (Medicare)  Secondary: [employer] MEDICARE [facility] [place]  [place] [address]  Phone: [phone]  Fax: [phone] Plan Frequency: 2/week x 12 weeks  Plan of Care/Certification Expiration Date: 25        Plan of Care/Certification Expiration Date:  Plan of Care/Certification Expiration Date: 25    Frequency/Duration: Plan Frequency: 2/week x 12 weeks      Time In/Out:   Time In: 1030  Time Out: 1115      PT Visit Info:    Total # of Visits to Date: 31      Visit Count:  31                OUTPATIENT PHYSICAL THERAPY:             Discharge Summary 3/5/2025               Episode (low back and hip pain)         Treatment Diagnosis:     No data found  Medical/Referring Diagnosis:    Chronic left-sided low back pain, unspecified whether sciatica present  Unsteadiness on feet      Referring Physician:  Pascual Lares DO MD Orders:  PT Eval and Treat   Return MD Appt:  25  Date of Onset:  Onset Date: 24  Allergies:  Patient has no known allergies.  Restrictions/Precautions:    None      Medications Last Reviewed:  3/5/2025     SUBJECTIVE   History of Injury/Illness (Reason for Referral):  Pt began having pain in April after doing some yard work and working on a car.  He had several days of not being able to straighten up.  Pain has improved but he is still having back pain.  The pain also travels down into the R hip at times.  Pain is worse with lifting and working in the yard and is uncomfortable with sitting for long periods.  Pain is worse in the morning.    Pt also is concerned that he cannot hold his head up straight and reports more difficulty when turning to see while driving.   Current situation-  24  Pt has attended 20 visits of PT. He is making improvements and is engaged in his therapy and HEP. He continues to have deficits in

## 2025-03-11 ENCOUNTER — HOSPITAL ENCOUNTER (OUTPATIENT)
Dept: MAMMOGRAPHY | Age: 75
Discharge: HOME | End: 2025-03-14
Attending: STUDENT IN AN ORGANIZED HEALTH CARE EDUCATION/TRAINING PROGRAM

## 2025-03-11 DIAGNOSIS — M85.851 OSTEOPENIA OF NECK OF RIGHT FEMUR: ICD-10-CM

## 2025-03-25 ENCOUNTER — HOSPITAL ENCOUNTER (OUTPATIENT)
Dept: MAMMOGRAPHY | Age: 75
Discharge: HOME | End: 2025-03-28
Attending: STUDENT IN AN ORGANIZED HEALTH CARE EDUCATION/TRAINING PROGRAM
Payer: MEDICARE

## 2025-03-25 PROCEDURE — 77080 DXA BONE DENSITY AXIAL: CPT

## 2025-03-26 ENCOUNTER — RESULTS FOLLOW-UP (OUTPATIENT)
Dept: INTERNAL MEDICINE CLINIC | Facility: CLINIC | Age: 75
End: 2025-03-26

## 2025-08-07 ENCOUNTER — TELEPHONE (OUTPATIENT)
Dept: INTERNAL MEDICINE CLINIC | Facility: CLINIC | Age: 75
End: 2025-08-07

## 2025-08-14 DIAGNOSIS — E83.110 HEREDITARY HEMOCHROMATOSIS: ICD-10-CM

## 2025-08-14 LAB
ALBUMIN SERPL-MCNC: 3.5 G/DL (ref 3.2–4.6)
ALBUMIN/GLOB SERPL: 1.3 (ref 1–1.9)
ALP SERPL-CCNC: 56 U/L (ref 40–129)
ALT SERPL-CCNC: 17 U/L (ref 8–55)
ANION GAP SERPL CALC-SCNC: 9 MMOL/L (ref 7–16)
AST SERPL-CCNC: 20 U/L (ref 15–37)
BASOPHILS # BLD: 0.04 K/UL (ref 0–0.2)
BASOPHILS NFR BLD: 0.6 % (ref 0–2)
BILIRUB SERPL-MCNC: 0.4 MG/DL (ref 0–1.2)
BUN SERPL-MCNC: 14 MG/DL (ref 8–23)
CALCIUM SERPL-MCNC: 9.5 MG/DL (ref 8.8–10.2)
CHLORIDE SERPL-SCNC: 105 MMOL/L (ref 98–107)
CO2 SERPL-SCNC: 29 MMOL/L (ref 20–29)
CREAT SERPL-MCNC: 0.93 MG/DL (ref 0.8–1.3)
DIFFERENTIAL METHOD BLD: ABNORMAL
EOSINOPHIL # BLD: 0.06 K/UL (ref 0–0.8)
EOSINOPHIL NFR BLD: 0.9 % (ref 0.5–7.8)
ERYTHROCYTE [DISTWIDTH] IN BLOOD BY AUTOMATED COUNT: 12.8 % (ref 11.9–14.6)
FERRITIN SERPL-MCNC: 44 NG/ML (ref 8–388)
GLOBULIN SER CALC-MCNC: 2.8 G/DL (ref 2.3–3.5)
GLUCOSE SERPL-MCNC: 100 MG/DL (ref 70–99)
HCT VFR BLD AUTO: 47.8 % (ref 41.1–50.3)
HGB BLD-MCNC: 16 G/DL (ref 13.6–17.2)
IMM GRANULOCYTES # BLD AUTO: 0.02 K/UL (ref 0–0.5)
IMM GRANULOCYTES NFR BLD AUTO: 0.3 % (ref 0–5)
IRON SATN MFR SERPL: 27 % (ref 20–50)
IRON SERPL-MCNC: 65 UG/DL (ref 35–100)
LYMPHOCYTES # BLD: 3.08 K/UL (ref 0.5–4.6)
LYMPHOCYTES NFR BLD: 46.6 % (ref 13–44)
MCH RBC QN AUTO: 32.6 PG (ref 26.1–32.9)
MCHC RBC AUTO-ENTMCNC: 33.5 G/DL (ref 31.4–35)
MCV RBC AUTO: 97.4 FL (ref 82–102)
MONOCYTES # BLD: 0.86 K/UL (ref 0.1–1.3)
MONOCYTES NFR BLD: 13 % (ref 4–12)
NEUTS SEG # BLD: 2.55 K/UL (ref 1.7–8.2)
NEUTS SEG NFR BLD: 38.6 % (ref 43–78)
NRBC # BLD: 0 K/UL (ref 0–0.2)
PLATELET # BLD AUTO: 218 K/UL (ref 150–450)
PMV BLD AUTO: 10.9 FL (ref 9.4–12.3)
POTASSIUM SERPL-SCNC: 5 MMOL/L (ref 3.5–5.1)
PROT SERPL-MCNC: 6.3 G/DL (ref 6.3–8.2)
RBC # BLD AUTO: 4.91 M/UL (ref 4.23–5.6)
SODIUM SERPL-SCNC: 143 MMOL/L (ref 136–145)
TIBC SERPL-MCNC: 241 UG/DL (ref 240–450)
UIBC SERPL-MCNC: 176 UG/DL (ref 112–347)
WBC # BLD AUTO: 6.6 K/UL (ref 4.3–11.1)

## 2025-08-21 ENCOUNTER — OFFICE VISIT (OUTPATIENT)
Dept: ONCOLOGY | Age: 75
End: 2025-08-21
Payer: MEDICARE

## 2025-08-21 VITALS
OXYGEN SATURATION: 96 % | TEMPERATURE: 98 F | SYSTOLIC BLOOD PRESSURE: 135 MMHG | HEART RATE: 60 BPM | HEIGHT: 69 IN | WEIGHT: 161.8 LBS | RESPIRATION RATE: 18 BRPM | DIASTOLIC BLOOD PRESSURE: 69 MMHG | BODY MASS INDEX: 23.96 KG/M2

## 2025-08-21 DIAGNOSIS — E83.110 HEREDITARY HEMOCHROMATOSIS: Primary | ICD-10-CM

## 2025-08-21 PROCEDURE — 99214 OFFICE O/P EST MOD 30 MIN: CPT | Performed by: NURSE PRACTITIONER

## 2025-08-21 PROCEDURE — 1160F RVW MEDS BY RX/DR IN RCRD: CPT | Performed by: NURSE PRACTITIONER

## 2025-08-21 PROCEDURE — 1159F MED LIST DOCD IN RCRD: CPT | Performed by: NURSE PRACTITIONER

## 2025-08-21 PROCEDURE — 1125F AMNT PAIN NOTED PAIN PRSNT: CPT | Performed by: NURSE PRACTITIONER

## 2025-08-21 PROCEDURE — G8420 CALC BMI NORM PARAMETERS: HCPCS | Performed by: NURSE PRACTITIONER

## 2025-08-21 PROCEDURE — 1036F TOBACCO NON-USER: CPT | Performed by: NURSE PRACTITIONER

## 2025-08-21 PROCEDURE — G8427 DOCREV CUR MEDS BY ELIG CLIN: HCPCS | Performed by: NURSE PRACTITIONER

## 2025-08-21 PROCEDURE — 1123F ACP DISCUSS/DSCN MKR DOCD: CPT | Performed by: NURSE PRACTITIONER
